# Patient Record
Sex: FEMALE | Race: WHITE | NOT HISPANIC OR LATINO | Employment: PART TIME | URBAN - METROPOLITAN AREA
[De-identification: names, ages, dates, MRNs, and addresses within clinical notes are randomized per-mention and may not be internally consistent; named-entity substitution may affect disease eponyms.]

---

## 2017-03-20 ENCOUNTER — HOSPITAL ENCOUNTER (OUTPATIENT)
Dept: MRI IMAGING | Facility: HOSPITAL | Age: 62
Discharge: HOME | End: 2017-03-20
Attending: NEUROLOGICAL SURGERY
Payer: COMMERCIAL

## 2017-03-20 DIAGNOSIS — I72.5 ANEURYSM OF OTHER PRECEREBRAL ARTERIES (HCC): ICD-10-CM

## 2017-03-20 PROCEDURE — 70544 MR ANGIOGRAPHY HEAD W/O DYE: CPT

## 2017-03-21 ENCOUNTER — ALLSCRIPTS OFFICE VISIT (OUTPATIENT)
Dept: OTHER | Facility: OTHER | Age: 62
End: 2017-03-21

## 2018-01-16 NOTE — PROGRESS NOTES
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)    Plan    · Follow-up PRN Evaluation and Treatment  Follow-up  Status: Complete  Done:  [id number]   Ordered; For: Aneurysm of basilar artery; Ordered By: Katherin Blank Performed:  Due: [id number]    Discussion/Summary    This is a 71-year-old female with an abnormality noted incidentally at the basilar apex  There is no change on serial imaging studies  I gave this patient options of repeating the study in 5 years or not at all  I do believe that the risk of this aneurysm rupturing and are very small given her age, control of blood pressure, lack of smoking, and control of hypercholesterolemia  Accordingly we have scheduled to see her back on an as-needed basis but I have not ordered follow-up imaging studies  Chief Complaint  Patient presents for 6 month follow up with MRA Head  Dx: Aneurysm of basilar artery      History of Present Illness  Patient is a 71-year-old female who has an incidentally noted artery aneurysm which is 4 mm in size  Initial presentation included visual disturbance; she reports she went to see the Eye doctor but all they suggested was that she needed glassess for long distance  On July 15, 2016 she stood up and when she turned she fell sideways and landed on a step  She had a CT Head and they noted an abnormality on CAT scan  She was sent for an MRI of the brain and also MRI of the cervical sine as well as an MRA Head where the aneurysm was confirmed  This appears to be smooth-walled and has a broad neck is features suggest a low incidence of rupture  I've recommended watchful waiting for this  Today, patient offers no complaints  Review of Systems    Constitutional: No fever, no chills, feels well, no tiredness, no recent weight gain or weight loss  Eyes: No complaints of eye pain, no red eyes, no eyesight problems, no discharge, no dry eyes, no itching of eyes     ENT: no complaints of earache, no loss of hearing, no nose bleeds, no nasal discharge, no sore throat, no hoarseness  Cardiovascular: No complaints of slow heart rate, no fast heart rate, no chest pain, no palpitations, no leg claudication, no lower extremity edema  Respiratory: No complaints of shortness of breath, no wheezing, no cough, no SOB on exertion, no orthopnea, no PND  Gastrointestinal: No complaints of abdominal pain, no constipation, no nausea or vomiting, no diarrhea, no bloody stools  Genitourinary: No complaints of dysuria, no incontinence, no pelvic pain, no dysmenorrhea, no vaginal discharge or bleeding  Musculoskeletal: No complaints of arthralgias, no myalgias, no joint swelling or stiffness, no limb pain or swelling  Integumentary: No complaints of skin rash or lesions, no itching, no skin wounds, no breast pain or lump  Neurological: No complaints of headache, no confusion, no convulsions, no numbness, no dizziness or fainting, no tingling, no limb weakness, no difficulty walking  Psychiatric: Not suicidal, no sleep disturbance, no anxiety or depression, no change in personality, no emotional problems  Endocrine: No complaints of proptosis, no hot flashes, no muscle weakness, no deepening of the voice, no feelings of weakness  Hematologic/Lymphatic: No complaints of swollen glands, no swollen glands in the neck, does not bleed easily, does not bruise easily  ROS reviewed  Active Problems    1  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)   2  Benign essential hypertension (401 1) (I10)   3  Chest pain (786 50) (R07 9)   4  Hyperlipidemia (272 4) (E78 5)   5  S/P drug eluting coronary stent placement (V45 82) (Z95 5)    Past Medical History    1  History of Atypical chest pain (786 59) (R07 89)   2  History of hypertension (V12 59) (Z86 79)   3  Old myocardial infarction (412) (I25 2)    The active problems and past medical history were reviewed and updated today  Surgical History    1   History of Cath Stent Placement    The surgical history was reviewed and updated today  Family History  Mother    1  No pertinent family history    The family history was reviewed and updated today  Social History    · Former smoker (B06 67) (J00 881)   · Housewife or homemaker   ·    · No alcohol use   · No drug use   · Two children  The social history was reviewed and updated today  Current Meds   1  Aspirin 81 MG TABS; TAKE 1 TABLET DAILY; Therapy: (Recorded:19Oct2015) to Recorded   2  Atorvastatin Calcium 80 MG Oral Tablet; TAKE 1 TABLET DAILY; Therapy: (Recorded:19Oct2015) to Recorded   3  Celecoxib 200 MG Oral Capsule; Take 1 capsule daily as needed Recorded   4  Nitrostat 0 4 MG Sublingual Tablet Sublingual; DISSOLVE 1 TABLET UNDER THE   TONGUE AS NEEDED FOR CHEST PAIN;   Therapy: (Recorded:19Oct2015) to Recorded   5  Toprol XL 25 MG Oral Tablet Extended Release 24 Hour; Take 1 tablet daily; Therapy: (Recorded:19Oct2015) to Recorded   6  TraMADol HCl - 50 MG Oral Tablet; TAKE 1 TABLET Every 6 hours PRN; Therapy: (Recorded:19Oct2015) to Recorded    The medication list was reviewed and updated today  Allergies    1  Penicillins    Vitals  Vital Signs    Recorded: 21Mar2017 08:59AM   Temperature 96 8 F   Heart Rate 64   Respiration 16   Systolic 889   Diastolic 88   Height 5 ft 6 in   Weight 194 lb    BMI Calculated 31 31   BSA Calculated 1 98   Pain Scale 0     Physical Exam     Mental Status: Alert and Oriented x3  Memory is intact  Attentive  Speech is articulate and fluent  Knowledge and vocabulary consistent with education  Grossly nonfocal   Judgment and insight: Normal   Mood and affect: Normal     Cranial Nerve Exam:  2nd cranial nerve: Normal with no noted deficit  3rd, 4th, and 6th cranial nerves: PERRLA and EOMI without later gaze nystagmus  5th cranial nerve: Normal with no noted deficit  7th cranial nerve:  Face symmetrical at grimace and at rest   8th cranial nerve: Grossly intact to finger rub bilaterally  11th cranial nerve: Shoulder shrug equal bilaterally  Motor System - Upper Extremities: Muscle strength: 5/5 bilaterally  Muscle tone: Normal bilaterally  Muscle Bulk: Normal Muscle bulk bilaterally  Motor System - Lower Extremities: Muscle strength: 5/5 bilaterally  Muscle tone: Normal bilaterally  Muscle Bulk: Normal Muscle bulk bilaterally  Reflexes: Mel Main Sign was present:   Coordination: Coordination: Finger to nose was normal, heel to knee to shin was normal able to perform rapid alternating movements well bilaterally  Involuntary movements: None   Sensory: Sensation:Sensation grossly intact to light tough and pinprick  Gait and Station: Gait and station: Linda with a normal gait and station  Tandem walk is normal, Heel walk and toe walk intact and without sign of paresis  Constitutional General appearance: No acute distress, well appearing and well nourished  Head and Face Head and face: Normal, atraumatic  Results/Data  Diagnostic Studies Reviewed Neuro[facility]:   I personally reviewed the MRI and MRA of the brain in detail with the patient  MRI Review An MRI and MRA of the brain Are carefully reviewed  There is an abnormality at the basilar apex  This appears to be associated with the takeoff of the first division of the posterior cerebral artery  The superior cerebral artery is inferior to this  This dilated portion of the takeoff may be an aneurysm  This is unchanged from the previous study  Alternatively this could be a dolicoectatic region of the basilar apex  Importantly there is no change in the size of this region in comparison to the previous study        Signatures   Electronically signed by : JOANN Chavez ; Mar 22 2017 10:17AM EST                       (Author)

## 2018-01-22 VITALS
WEIGHT: 194 LBS | BODY MASS INDEX: 31.18 KG/M2 | DIASTOLIC BLOOD PRESSURE: 88 MMHG | TEMPERATURE: 96.8 F | RESPIRATION RATE: 16 BRPM | HEIGHT: 66 IN | HEART RATE: 64 BPM | SYSTOLIC BLOOD PRESSURE: 139 MMHG

## 2019-11-20 ENCOUNTER — TRANSCRIBE ORDERS (OUTPATIENT)
Dept: ADMINISTRATIVE | Facility: HOSPITAL | Age: 64
End: 2019-11-20

## 2019-11-20 DIAGNOSIS — M25.561 RIGHT KNEE PAIN, UNSPECIFIED CHRONICITY: Primary | ICD-10-CM

## 2019-11-26 ENCOUNTER — HOSPITAL ENCOUNTER (OUTPATIENT)
Dept: RADIOLOGY | Facility: HOSPITAL | Age: 64
Discharge: HOME | End: 2019-11-26
Attending: FAMILY MEDICINE

## 2019-11-26 DIAGNOSIS — M25.561 RIGHT KNEE PAIN, UNSPECIFIED CHRONICITY: ICD-10-CM

## 2019-12-09 ENCOUNTER — HOSPITAL ENCOUNTER (OUTPATIENT)
Dept: RADIOLOGY | Facility: HOSPITAL | Age: 64
Discharge: HOME | End: 2019-12-09
Attending: FAMILY MEDICINE
Payer: COMMERCIAL

## 2019-12-09 PROCEDURE — 73721 MRI JNT OF LWR EXTRE W/O DYE: CPT

## 2019-12-19 ENCOUNTER — HOSPITAL ENCOUNTER (OUTPATIENT)
Facility: HOSPITAL | Age: 64
Setting detail: OBSERVATION
Discharge: HOME | End: 2019-12-20
Attending: EMERGENCY MEDICINE | Admitting: INTERNAL MEDICINE
Payer: COMMERCIAL

## 2019-12-19 ENCOUNTER — APPOINTMENT (EMERGENCY)
Dept: RADIOLOGY | Facility: HOSPITAL | Age: 64
End: 2019-12-19
Payer: COMMERCIAL

## 2019-12-19 DIAGNOSIS — R07.9 CHEST PAIN: Primary | ICD-10-CM

## 2019-12-19 DIAGNOSIS — I25.10 CORONARY ARTERY DISEASE INVOLVING NATIVE HEART WITHOUT ANGINA PECTORIS, UNSPECIFIED VESSEL OR LESION TYPE: ICD-10-CM

## 2019-12-19 DIAGNOSIS — I10 ESSENTIAL HYPERTENSION: ICD-10-CM

## 2019-12-19 PROBLEM — E78.5 HYPERLIPIDEMIA: Status: ACTIVE | Noted: 2019-12-19

## 2019-12-19 PROBLEM — I72.5 ANEURYSM OF BASILAR ARTERY (HCC): Status: ACTIVE | Noted: 2019-12-19

## 2019-12-19 PROBLEM — M19.90 OSTEOARTHRITIS: Status: ACTIVE | Noted: 2019-12-19

## 2019-12-19 LAB
ALBUMIN SERPL BCP-MCNC: 3.4 G/DL (ref 3.5–5)
ALP SERPL-CCNC: 127 U/L (ref 46–116)
ALT SERPL W P-5'-P-CCNC: 25 U/L (ref 12–78)
ANION GAP SERPL CALCULATED.3IONS-SCNC: 9 MMOL/L (ref 4–13)
APTT PPP: 23 SECONDS (ref 23–37)
AST SERPL W P-5'-P-CCNC: 23 U/L (ref 5–45)
ATRIAL RATE: 58 BPM
BASOPHILS # BLD AUTO: 0.06 THOUSANDS/ΜL (ref 0–0.1)
BASOPHILS NFR BLD AUTO: 1 % (ref 0–1)
BILIRUB SERPL-MCNC: 0.86 MG/DL (ref 0.2–1)
BUN SERPL-MCNC: 12 MG/DL (ref 5–25)
CALCIUM SERPL-MCNC: 8.8 MG/DL (ref 8.3–10.1)
CHLORIDE SERPL-SCNC: 105 MMOL/L (ref 100–108)
CK SERPL-CCNC: 67 U/L (ref 26–192)
CO2 SERPL-SCNC: 28 MMOL/L (ref 21–32)
CREAT SERPL-MCNC: 0.88 MG/DL (ref 0.6–1.3)
EOSINOPHIL # BLD AUTO: 0.16 THOUSAND/ΜL (ref 0–0.61)
EOSINOPHIL NFR BLD AUTO: 2 % (ref 0–6)
ERYTHROCYTE [DISTWIDTH] IN BLOOD BY AUTOMATED COUNT: 12 % (ref 11.6–15.1)
GFR SERPL CREATININE-BSD FRML MDRD: 70 ML/MIN/1.73SQ M
GLUCOSE SERPL-MCNC: 169 MG/DL (ref 65–140)
HCT VFR BLD AUTO: 42.6 % (ref 34.8–46.1)
HGB BLD-MCNC: 13.7 G/DL (ref 11.5–15.4)
IMM GRANULOCYTES # BLD AUTO: 0.04 THOUSAND/UL (ref 0–0.2)
IMM GRANULOCYTES NFR BLD AUTO: 0 % (ref 0–2)
INR PPP: 1 (ref 0.84–1.19)
LIPASE SERPL-CCNC: 166 U/L (ref 73–393)
LYMPHOCYTES # BLD AUTO: 3.83 THOUSANDS/ΜL (ref 0.6–4.47)
LYMPHOCYTES NFR BLD AUTO: 37 % (ref 14–44)
MAGNESIUM SERPL-MCNC: 2.2 MG/DL (ref 1.6–2.6)
MCH RBC QN AUTO: 29.1 PG (ref 26.8–34.3)
MCHC RBC AUTO-ENTMCNC: 32.2 G/DL (ref 31.4–37.4)
MCV RBC AUTO: 91 FL (ref 82–98)
MONOCYTES # BLD AUTO: 0.8 THOUSAND/ΜL (ref 0.17–1.22)
MONOCYTES NFR BLD AUTO: 8 % (ref 4–12)
NEUTROPHILS # BLD AUTO: 5.54 THOUSANDS/ΜL (ref 1.85–7.62)
NEUTS SEG NFR BLD AUTO: 52 % (ref 43–75)
NRBC BLD AUTO-RTO: 0 /100 WBCS
P AXIS: 65 DEGREES
PLATELET # BLD AUTO: 185 THOUSANDS/UL (ref 149–390)
PMV BLD AUTO: 11.8 FL (ref 8.9–12.7)
POTASSIUM SERPL-SCNC: 4.3 MMOL/L (ref 3.5–5.3)
PR INTERVAL: 158 MS
PROT SERPL-MCNC: 7.3 G/DL (ref 6.4–8.2)
PROTHROMBIN TIME: 12.6 SECONDS (ref 11.6–14.5)
QRS AXIS: 58 DEGREES
QRSD INTERVAL: 88 MS
QT INTERVAL: 416 MS
QTC INTERVAL: 408 MS
RBC # BLD AUTO: 4.7 MILLION/UL (ref 3.81–5.12)
SODIUM SERPL-SCNC: 142 MMOL/L (ref 136–145)
T WAVE AXIS: 80 DEGREES
TROPONIN I SERPL-MCNC: <0.02 NG/ML
VENTRICULAR RATE: 58 BPM
WBC # BLD AUTO: 10.43 THOUSAND/UL (ref 4.31–10.16)

## 2019-12-19 PROCEDURE — 83690 ASSAY OF LIPASE: CPT | Performed by: EMERGENCY MEDICINE

## 2019-12-19 PROCEDURE — 82550 ASSAY OF CK (CPK): CPT | Performed by: EMERGENCY MEDICINE

## 2019-12-19 PROCEDURE — 84484 ASSAY OF TROPONIN QUANT: CPT | Performed by: NURSE PRACTITIONER

## 2019-12-19 PROCEDURE — 84484 ASSAY OF TROPONIN QUANT: CPT | Performed by: EMERGENCY MEDICINE

## 2019-12-19 PROCEDURE — 93005 ELECTROCARDIOGRAM TRACING: CPT

## 2019-12-19 PROCEDURE — 71045 X-RAY EXAM CHEST 1 VIEW: CPT

## 2019-12-19 PROCEDURE — 80053 COMPREHEN METABOLIC PANEL: CPT | Performed by: EMERGENCY MEDICINE

## 2019-12-19 PROCEDURE — 36415 COLL VENOUS BLD VENIPUNCTURE: CPT | Performed by: EMERGENCY MEDICINE

## 2019-12-19 PROCEDURE — 93010 ELECTROCARDIOGRAM REPORT: CPT | Performed by: INTERNAL MEDICINE

## 2019-12-19 PROCEDURE — 99219 PR INITIAL OBSERVATION CARE/DAY 50 MINUTES: CPT | Performed by: INTERNAL MEDICINE

## 2019-12-19 PROCEDURE — 83735 ASSAY OF MAGNESIUM: CPT | Performed by: EMERGENCY MEDICINE

## 2019-12-19 PROCEDURE — 85610 PROTHROMBIN TIME: CPT | Performed by: EMERGENCY MEDICINE

## 2019-12-19 PROCEDURE — 85730 THROMBOPLASTIN TIME PARTIAL: CPT | Performed by: EMERGENCY MEDICINE

## 2019-12-19 PROCEDURE — 99285 EMERGENCY DEPT VISIT HI MDM: CPT | Performed by: EMERGENCY MEDICINE

## 2019-12-19 PROCEDURE — 99285 EMERGENCY DEPT VISIT HI MDM: CPT

## 2019-12-19 PROCEDURE — 85025 COMPLETE CBC W/AUTO DIFF WBC: CPT | Performed by: EMERGENCY MEDICINE

## 2019-12-19 RX ORDER — ATORVASTATIN CALCIUM 40 MG/1
80 TABLET, FILM COATED ORAL
Status: DISCONTINUED | OUTPATIENT
Start: 2019-12-19 | End: 2019-12-20 | Stop reason: HOSPADM

## 2019-12-19 RX ORDER — ONDANSETRON 2 MG/ML
4 INJECTION INTRAMUSCULAR; INTRAVENOUS EVERY 6 HOURS PRN
Status: DISCONTINUED | OUTPATIENT
Start: 2019-12-19 | End: 2019-12-20 | Stop reason: HOSPADM

## 2019-12-19 RX ORDER — ASPIRIN 81 MG/1
162 TABLET, CHEWABLE ORAL ONCE
Status: COMPLETED | OUTPATIENT
Start: 2019-12-19 | End: 2019-12-19

## 2019-12-19 RX ORDER — CLOPIDOGREL BISULFATE 75 MG/1
75 TABLET ORAL DAILY
Status: DISCONTINUED | OUTPATIENT
Start: 2019-12-19 | End: 2019-12-20

## 2019-12-19 RX ORDER — TRAMADOL HYDROCHLORIDE 50 MG/1
50 TABLET ORAL EVERY 6 HOURS PRN
Status: DISCONTINUED | OUTPATIENT
Start: 2019-12-19 | End: 2019-12-20 | Stop reason: HOSPADM

## 2019-12-19 RX ORDER — ASPIRIN 81 MG/1
81 TABLET ORAL DAILY
Status: DISCONTINUED | OUTPATIENT
Start: 2019-12-19 | End: 2019-12-20 | Stop reason: HOSPADM

## 2019-12-19 RX ORDER — METOPROLOL SUCCINATE 50 MG/1
50 TABLET, EXTENDED RELEASE ORAL DAILY
Status: DISCONTINUED | OUTPATIENT
Start: 2019-12-19 | End: 2019-12-20 | Stop reason: HOSPADM

## 2019-12-19 RX ADMIN — ASPIRIN 81 MG 162 MG: 81 TABLET ORAL at 06:56

## 2019-12-19 RX ADMIN — NITROGLYCERIN 0.5 INCH: 20 OINTMENT TOPICAL at 06:56

## 2019-12-19 RX ADMIN — METOPROLOL SUCCINATE 50 MG: 50 TABLET, EXTENDED RELEASE ORAL at 13:58

## 2019-12-19 RX ADMIN — ASPIRIN 81 MG: 81 TABLET, COATED ORAL at 13:58

## 2019-12-19 RX ADMIN — ATORVASTATIN CALCIUM 80 MG: 40 TABLET, FILM COATED ORAL at 17:37

## 2019-12-19 NOTE — PLAN OF CARE
Problem: PAIN - ADULT  Goal: Verbalizes/displays adequate comfort level or baseline comfort level  Description  Interventions:  - Encourage patient to monitor pain and request assistance  - Assess pain using appropriate pain scale  - Administer analgesics based on type and severity of pain and evaluate response  - Implement non-pharmacological measures as appropriate and evaluate response  - Consider cultural and social influences on pain and pain management  - Notify physician/advanced practitioner if interventions unsuccessful or patient reports new pain  Outcome: Progressing     Problem: SAFETY ADULT  Goal: Patient will remain free of falls  Description  INTERVENTIONS:  - Assess patient frequently for physical needs  -  Identify cognitive and physical deficits and behaviors that affect risk of falls    -  [place] fall precautions as indicated by assessment   - Educate patient/family on patient safety including physical limitations  - Instruct patient to call for assistance with activity based on assessment  - Modify environment to reduce risk of injury  - Consider OT/PT consult to assist with strengthening/mobility  Outcome: Progressing  Goal: Maintain or return to baseline ADL function  Description  INTERVENTIONS:  -  Assess patient's ability to carry out ADLs; assess patient's baseline for ADL function and identify physical deficits which impact ability to perform ADLs (bathing, care of mouth/teeth, toileting, grooming, dressing, etc )  - Assess/evaluate cause of self-care deficits   - Assess range of motion  - Assess patient's mobility; develop plan if impaired  - Assess patient's need for assistive devices and provide as appropriate  - Encourage maximum independence but intervene and supervise when necessary  - Involve family in performance of ADLs  - Assess for home care needs following discharge   - Consider OT consult to assist with ADL evaluation and planning for discharge  - Provide patient education as appropriate  Outcome: Progressing  Goal: Maintain or return mobility status to optimal level  Description  INTERVENTIONS:  - Assess patient's baseline mobility status (ambulation, transfers, stairs, etc )    - Identify cognitive and physical deficits and behaviors that affect mobility  - Identify mobility aids required to assist with transfers and/or ambulation (gait belt, sit-to-stand, lift, walker, cane, etc )  - [place] fall precautions as indicated by assessment  - Record patient progress and toleration of activity level on Mobility SBAR; progress patient to next Phase/Stage  - Instruct patient to call for assistance with activity based on assessment  - Consider rehabilitation consult to assist with strengthening/weightbearing, etc   Outcome: Progressing     Problem: CARDIOVASCULAR - ADULT  Goal: Maintains optimal cardiac output and hemodynamic stability  Description  INTERVENTIONS:  - Monitor I/O, vital signs and rhythm  - Monitor for S/S and trends of decreased cardiac output  - Administer and titrate ordered vasoactive medications to optimize hemodynamic stability  - Assess quality of pulses, skin color and temperature  - Assess for signs of decreased coronary artery perfusion  - Instruct patient to report change in severity of symptoms  Outcome: Progressing  Goal: Absence of cardiac dysrhythmias or at baseline rhythm  Description  INTERVENTIONS:  - Continuous cardiac monitoring, vital signs, obtain 12 lead EKG if ordered  - Administer antiarrhythmic and heart rate control medications as ordered  - Monitor electrolytes and administer replacement therapy as ordered  Outcome: Progressing

## 2019-12-19 NOTE — ASSESSMENT & PLAN NOTE
· Left sided cp relieved by nitro  Felt similar pain as when she had her last MI   NINOSKA: 2   Initial Troponin: <0 02   Trend x3 or to peak   Initial EKG: Sinus bradycardia, heart rate 58  T ave inversions on anterior leads  NINOSKA score 2  Never had stress test since her stent   Will obtain inpatient stress test

## 2019-12-19 NOTE — ED PROVIDER NOTES
History  Chief Complaint   Patient presents with    Chest Pain     Pt  having intermittant chest pain  Took 1 SL ntg with some relief, history of cardiac stent     Patient is a 62year old female with intermittent worsening left sided chest pain since yesterday without radiation  No sob, fever or N/V  No cough  No travel  Tried NTG at home with brief relief but pain returns  Was last seen in this ED on 7/15/16 for head injury  [facility] website checked on this patient and last Rx filled was on 11/19/19 for tramadol for 23 day supply  History provided by:  Patient   used: No    Chest Pain   Associated symptoms: no cough, no fever, no nausea, no shortness of breath and not vomiting        Prior to Admission Medications   Prescriptions Last Dose Informant Patient Reported? Taking? Atorvastatin Calcium (LIPITOR PO)   Yes No   Sig: Take by mouth  Topiramate (TOPAMAX PO)   Yes No   Sig: Take by mouth  aspirin 81 MG tablet   Yes Yes   Sig: Take 81 mg by mouth daily  celecoxib (CeleBREX) 50 MG capsule   Yes No   Sig: Take 50 mg by mouth daily    clopidogrel (PLAVIX) 75 mg tablet   Yes No   Sig: Take 75 mg by mouth daily  diclofenac (VOLTAREN) 75 mg EC tablet   No No   Sig: Take 1 tablet (75 mg total) by mouth 2 (two) times a day  traMADol (ULTRAM) 50 mg tablet   Yes Yes   Sig: Take 50 mg by mouth every 6 (six) hours as needed for moderate pain  Facility-Administered Medications: None       Past Medical History:   Diagnosis Date    Cardiac disease        Past Surgical History:   Procedure Laterality Date    CORONARY STENT PLACEMENT         History reviewed  No pertinent family history  I have reviewed and agree with the history as documented  Social History     Tobacco Use    Smoking status: Former Smoker    Smokeless tobacco: Never Used   Substance Use Topics    Alcohol use: No    Drug use: No        Review of Systems   Constitutional: Negative for fever     Respiratory: Negative for cough and shortness of breath  Cardiovascular: Positive for chest pain  Gastrointestinal: Negative for nausea and vomiting  All other systems reviewed and are negative  Physical Exam  Physical Exam   Constitutional: She is oriented to person, place, and time  She appears well-developed and well-nourished  She appears distressed (moderate)  HENT:   Head: Normocephalic and atraumatic  Mucous membranes moist     Eyes: No scleral icterus  Neck: Normal range of motion  Neck supple  No JVD present  No tracheal deviation present  Cardiovascular: Normal rate, regular rhythm and normal heart sounds  No murmur heard  Pulmonary/Chest: Effort normal and breath sounds normal  No stridor  No respiratory distress  She has no wheezes  She has no rales  She exhibits no tenderness  Abdominal: Soft  Bowel sounds are normal  She exhibits no distension  There is no tenderness  Musculoskeletal: She exhibits no edema or deformity  Neurological: She is alert and oriented to person, place, and time  Skin: Skin is warm and dry  No rash noted  Psychiatric: She has a normal mood and affect  Nursing note and vitals reviewed        Vital Signs  ED Triage Vitals [12/19/19 0641]   Temperature Pulse Respirations Blood Pressure SpO2   97 6 °F (36 4 °C) 62 18 156/73 98 %      Temp Source Heart Rate Source Patient Position - Orthostatic VS BP Location FiO2 (%)   Oral Monitor Lying Right arm --      Pain Score       2           Vitals:    12/19/19 0641   BP: 156/73   Pulse: 62   Patient Position - Orthostatic VS: Lying         Visual Acuity      ED Medications  Medications   nitroglycerin (NITRO-BID) 2 % TD ointment 0 5 inch (0 5 inches Topical Given 12/19/19 0656)   aspirin chewable tablet 162 mg (162 mg Oral Given 12/19/19 0656)       Diagnostic Studies  Results Reviewed     Procedure Component Value Units Date/Time    Troponin I [[id number]]  (Normal) Collected:  12/19/19 0653    Lab Status:  Final result Specimen:  Blood from Arm, Right Updated:  12/19/19 0721     Troponin I <0 02 ng/mL     Comprehensive metabolic panel [[id number]]  (Abnormal) Collected:  12/19/19 0653    Lab Status:  Final result Specimen:  Blood from Arm, Right Updated:  12/19/19 0719     Sodium 142 mmol/L      Potassium 4 3 mmol/L      Chloride 105 mmol/L      CO2 28 mmol/L      ANION GAP 9 mmol/L      BUN 12 mg/dL      Creatinine 0 88 mg/dL      Glucose 169 mg/dL      Calcium 8 8 mg/dL      AST 23 U/L      ALT 25 U/L      Alkaline Phosphatase 127 U/L      Total Protein 7 3 g/dL      Albumin 3 4 g/dL      Total Bilirubin 0 86 mg/dL      eGFR 70 ml/min/1 73sq m     Narrative:       [place] guidelines for Chronic Kidney Disease (CKD):     Stage 1 with normal or high GFR (GFR > 90 mL/min/1 73 square meters)    Stage 2 Mild CKD (GFR = 60-89 mL/min/1 73 square meters)    Stage 3A Moderate CKD (GFR = 45-59 mL/min/1 73 square meters)    Stage 3B Moderate CKD (GFR = 30-44 mL/min/1 73 square meters)    Stage 4 Severe CKD (GFR = 15-29 mL/min/1 73 square meters)    Stage 5 End Stage CKD (GFR <15 mL/min/1 73 square meters)  Note: GFR calculation is accurate only with a steady state creatinine    Lipase [[id number]]  (Normal) Collected:  12/19/19 0653    Lab Status:  Final result Specimen:  Blood from Arm, Right Updated:  12/19/19 0719     Lipase 166 u/L     CK Total with Reflex CKMB [[id number]]  (Normal) Collected:  12/19/19 0653    Lab Status:  Final result Specimen:  Blood from Arm, Right Updated:  12/19/19 0719     Total CK 67 U/L     Magnesium [[id number]]  (Normal) Collected:  12/19/19 0653    Lab Status:  Final result Specimen:  Blood from Arm, Right Updated:  12/19/19 0719     Magnesium 2 2 mg/dL     CBC and differential [[id number]]  (Abnormal) Collected:  12/19/19 0653    Lab Status:  Final result Specimen:  Blood from Arm, Right Updated:  12/19/19 0701     WBC 10 43 Thousand/uL      RBC 4 70 Million/uL Hemoglobin 13 7 g/dL      Hematocrit 42 6 %      MCV 91 fL      MCH 29 1 pg      MCHC 32 2 g/dL      RDW 12 0 %      MPV 11 8 fL      Platelets 176 Thousands/uL      nRBC 0 /100 WBCs      Neutrophils Relative 52 %      Immat GRANS % 0 %      Lymphocytes Relative 37 %      Monocytes Relative 8 %      Eosinophils Relative 2 %      Basophils Relative 1 %      Neutrophils Absolute 5 54 Thousands/µL      Immature Grans Absolute 0 04 Thousand/uL      Lymphocytes Absolute 3 83 Thousands/µL      Monocytes Absolute 0 80 Thousand/µL      Eosinophils Absolute 0 16 Thousand/µL      Basophils Absolute 0 06 Thousands/µL     Protime-INR [[id number]] Collected:  12/19/19 0653    Lab Status: In process Specimen:  Blood from Arm, Right Updated:  12/19/19 0658    APTT [[id number]] Collected:  12/19/19 0653    Lab Status: In process Specimen:  Blood from Arm, Right Updated:  12/19/19 0658                 XR chest 1 view portable   ED Interpretation by Rosana Damian MD (12/19 7242)   No acute disease read by me  Procedures  ECG 12 Lead Documentation Only  Date/Time: 12/19/2019 6:42 AM  Performed by: Rosana Damian MD  Authorized by: Rosana Damian MD     Indications / Diagnosis:  Chest pain  ECG reviewed by me, the ED Provider: yes    Patient location:  ED  Previous ECG:     Previous ECG:  Compared to current    Comparison ECG info:  12/2/15    Similarity:  Changes noted (s  daren now)  Rate:     ECG rate:  58    ECG rate assessment: bradycardic    Rhythm:     Rhythm: sinus bradycardia    Ectopy:     Ectopy: none    QRS:     QRS axis:  Normal    QRS intervals:  Normal  Conduction:     Conduction: normal    ST segments:     ST segments:  Normal  T waves:     T waves: inverted      Inverted:  V1, V2 and V3             ED Course  ED Course as of Dec 19 0735   Thu Dec 19, 2019   0651 EKG d/w patient        9021 Labs and CXR d/w patient  Patient feeling better               HEART Risk Score      Most Recent Value   History  2 Filed at: 12/19/2019 0728   ECG  1 Filed at: 12/19/2019 5047   Age  1 Filed at: 12/19/2019 4357   Risk Factors  2 Filed at: 12/19/2019 0728   Troponin  0 Filed at: 12/19/2019 0728   Heart Score Risk Calculator   History  2 Filed at: 12/19/2019 0728   ECG  1 Filed at: 12/19/2019 9286   Age  1 Filed at: 12/19/2019 8686   Risk Factors  2 Filed at: 12/19/2019 0728   Troponin  0 Filed at: 12/19/2019 0728   HEART Score  6 Filed at: 12/19/2019 0722   HEART Score  6 Filed at: 12/19/2019 0728                    NINOSKA Risk Score      Most Recent Value   Age >= 72  0 Filed at: 12/19/2019 0295   Known CAD (stenosis >= 50%)  1 Filed at: 12/19/2019 5153   Recent (<=24 hrs) Service Angina  0 Filed at: 12/19/2019 0733   ST Deviation >= 0 5 mm  0 Filed at: 12/19/2019 0729   3+ CAD Risk Factors (FHx, HTN, HLP, DM, Smoker)  1 Filed at: 12/19/2019 0063   Aspirin Use Past 7 Days  1 Filed at: 12/19/2019 2902   Elevated Cardiac Markers  0 Filed at: 12/19/2019 0729   NINOSKA Risk Score (Calculated)  2 Filed at: 12/19/2019 9033              MDM  Number of Diagnoses or Management Options  Diagnosis management comments: DDX including but not limited to: ACS, MI, PE, PTX, pneumonia, dissection, pleurisy, pericarditis, myocarditis, rhabdomyolysis, GI etiology          Amount and/or Complexity of Data Reviewed  Clinical lab tests: ordered and reviewed  Tests in the radiology section of CPT®: ordered and reviewed  Decide to obtain previous medical records or to obtain history from someone other than the patient: yes  Review and summarize past medical records: yes  Independent visualization of images, tracings, or specimens: yes          Disposition  Final diagnoses:   Chest pain     Time reflects when diagnosis was documented in both MDM as applicable and the Disposition within this note     Time User Action Codes Description Comment    12/19/2019  7:35 AM Carmen Valencia Add [R07 9] Chest pain       ED Disposition     ED Disposition Condition Date/Time Comment    Admit Stable Thu Dec 19, 2019  7:35 AM Case was discussed with ALEXIS Guzmán  and the patient's admission status was agreed to be Admission Status: observation status to the service of Dr Ros Linares   Follow-up Information    None         Patient's Medications   Discharge Prescriptions    No medications on file     No discharge procedures on file      ED Provider  Electronically Signed by           Darline Saucedo MD  12/19/19 1371

## 2019-12-19 NOTE — H&P
H&P- Reg Valdez 1955, 62 y o  female MRN: [id number]    Unit/Bed#: S -01 Encounter: [id number]    Primary Care Provider: Renee Ayers DO   Date and time admitted to hospital: 12/19/2019  6:32 AM        * Chest pain  Assessment & Plan  · Left sided cp relieved by nitro  Felt similar pain as when she had her last MI   NINOSKA: 2   Initial Troponin: <0 02   Trend x3 or to peak   Initial EKG: Sinus bradycardia, heart rate 58  T ave inversions on anterior leads  NINOSKA score 2  Never had stress test since her stent  Will obtain inpatient stress test    CAD (coronary artery disease)  Assessment & Plan  · History of CAD with stent placement  · Continue    Aneurysm of basilar artery (HCC)  Assessment & Plan  Followed by NSx    Hyperlipidemia  Assessment & Plan  · Continue statin  Hypertension  Assessment & Plan  · Blood pressure 150s/70s on admission  · Takes metoprolol XL at home      VTE Prophylaxis: Enoxaparin (Lovenox)  / sequential compression device   Code Status: Full code  POLST: POLST form is not discussed and not completed at this time  Discussion with family: Above    Anticipated Length of Stay:  Patient will be admitted on an Observation basis with an anticipated length of stay of  < 2 midnights  Justification for Hospital Stay: Above    Total Time for Visit, including Counseling / Coordination of Care: 20 minutes  Greater than 50% of this total time spent on direct patient counseling and coordination of care  Chief Complaint:   Chest pain    History of Present Illness:    Reg Valdez is a 62 y o  female with PMHx of CAD with prior stent, hypertension and dyslipidemia who presents with chest pain  She was in her usual state of health until yesterday afternoon when she started experiencing chest pain  She described as crampy pain in her left upper chest with no radiation and no associated dyspnea diaphoresis and palpitation    She felt it was similar to the pain that she had with her prior MI  She took 1 dose of nitro at home with some relief  Her pain did persist gap prompted her ER visit  She was given another nitro at home which subsided her chest pain  Currently she is pain free  Patient has not had stress test since her last MI about 4 years ago  Review of Systems:    Review of Systems   Constitutional: Negative for appetite change, chills and diaphoresis  Respiratory: Negative for cough, choking and shortness of breath  Cardiovascular: Positive for chest pain  Negative for palpitations  Gastrointestinal: Negative for abdominal pain and blood in stool  Genitourinary: Negative for dysuria and enuresis  All other systems reviewed and are negative  Past Medical and Surgical History:     Past Medical History:   Diagnosis Date    Cardiac disease        Past Surgical History:   Procedure Laterality Date    CORONARY STENT PLACEMENT         Meds/Allergies:    Prior to Admission medications    Medication Sig Start Date End Date Taking? Authorizing Provider   aspirin 81 MG tablet Take 81 mg by mouth daily  Yes Historical Provider, MD   traMADol (ULTRAM) 50 mg tablet Take 50 mg by mouth every 6 (six) hours as needed for moderate pain  Yes Historical Provider, MD   Atorvastatin Calcium (LIPITOR PO) Take by mouth  Historical Provider, MD   celecoxib (CeleBREX) 50 MG capsule Take 50 mg by mouth daily     Historical Provider, MD   clopidogrel (PLAVIX) 75 mg tablet Take 75 mg by mouth daily  Historical Provider, MD   diclofenac (VOLTAREN) 75 mg EC tablet Take 1 tablet (75 mg total) by mouth 2 (two) times a day  3/9/16   Gabriel Black DO   Topiramate (TOPAMAX PO) Take by mouth  Historical Provider, MD         Allergies: Allergies   Allergen Reactions    Penicillins Anaphylaxis     Hives and throat closes   No epi pen or medical alert bracelet       Social History:     Marital Status:   Patient Pre-hospital Living Situation: Home    Social History Substance and Sexual Activity   Alcohol Use No     Social History     Tobacco Use   Smoking Status Former Smoker   Smokeless Tobacco Never Used     Social History     Substance and Sexual Activity   Drug Use No       Family History:    History reviewed  No pertinent family history  Physical Exam:     Vitals:   Blood Pressure: 144/71 (12/19/19 0812)  Pulse: 57 (12/19/19 0812)  Temperature: 98 °F (36 7 °C) (12/19/19 0812)  Temp Source: Oral (12/19/19 6939)  Respirations: 19 (12/19/19 0812)  Height: 5' 6" (167 6 cm) (12/19/19 7991)  Weight - Scale: 89 4 kg (197 lb) (12/19/19 0812)  SpO2: 97 % (12/19/19 1022)    Physical Exam   Constitutional: She is oriented to person, place, and time  No distress  Cardiovascular: Normal rate and regular rhythm  Pulmonary/Chest: Effort normal and breath sounds normal  No respiratory distress  Abdominal: Soft  Bowel sounds are normal  She exhibits no distension  Musculoskeletal: She exhibits no edema  Neurological: She is alert and oriented to person, place, and time  Skin: Skin is warm  She is not diaphoretic  Psychiatric: She has a normal mood and affect  Additional Data:     Lab Results:     Results from last 7 days   Lab Units 12/19/19  0653   WBC Thousand/uL 10 43*   HEMOGLOBIN g/dL 13 7   HEMATOCRIT % 42 6   PLATELETS Thousands/uL 185   NEUTROS PCT % 52   LYMPHS PCT % 37   MONOS PCT % 8   EOS PCT % 2     Results from last 7 days   Lab Units 12/19/19  0653   SODIUM mmol/L 142   POTASSIUM mmol/L 4 3   CHLORIDE mmol/L 105   CO2 mmol/L 28   BUN mg/dL 12   CREATININE mg/dL 0 88   ANION GAP mmol/L 9   CALCIUM mg/dL 8 8   ALBUMIN g/dL 3 4*   TOTAL BILIRUBIN mg/dL 0 86   ALK PHOS U/L 127*   ALT U/L 25   AST U/L 23   GLUCOSE RANDOM mg/dL 169*     Results from last 7 days   Lab Units 12/19/19  0653   INR  1 00                   Imaging:    XR chest 1 view portable   ED Interpretation by Georgina Chen MD (12/19 1269)   No acute disease read by me  Final Result by Isabel Izaguirre MD (12/19 9201)      No acute cardiopulmonary disease  Workstation performed: [id number]             EKG, Pathology, and Other Studies Reviewed on Admission:   · EKG: NSR 58/min  T wave inversions in anterior leads    Allscripts / Epic Records Reviewed: Yes     ** Please Note: This note has been constructed using a voice recognition system   **

## 2019-12-20 ENCOUNTER — APPOINTMENT (OUTPATIENT)
Dept: NUCLEAR MEDICINE | Facility: HOSPITAL | Age: 64
End: 2019-12-20
Payer: COMMERCIAL

## 2019-12-20 ENCOUNTER — APPOINTMENT (OUTPATIENT)
Dept: NON INVASIVE DIAGNOSTICS | Facility: HOSPITAL | Age: 64
End: 2019-12-20
Payer: COMMERCIAL

## 2019-12-20 VITALS
SYSTOLIC BLOOD PRESSURE: 141 MMHG | TEMPERATURE: 98 F | HEART RATE: 84 BPM | HEIGHT: 66 IN | DIASTOLIC BLOOD PRESSURE: 84 MMHG | WEIGHT: 197 LBS | RESPIRATION RATE: 18 BRPM | OXYGEN SATURATION: 98 % | BODY MASS INDEX: 31.66 KG/M2

## 2019-12-20 LAB
CHEST PAIN STATEMENT: NORMAL
MAX DIASTOLIC BP: 68 MMHG
MAX HEART RATE: 111 BPM
MAX PREDICTED HEART RATE: 157 BPM
MAX. SYSTOLIC BP: 126 MMHG
PROTOCOL NAME: NORMAL
REASON FOR TERMINATION: NORMAL
TARGET HR FORMULA: NORMAL
TEST INDICATION: NORMAL
TIME IN EXERCISE PHASE: NORMAL

## 2019-12-20 PROCEDURE — 93018 CV STRESS TEST I&R ONLY: CPT | Performed by: INTERNAL MEDICINE

## 2019-12-20 PROCEDURE — 78452 HT MUSCLE IMAGE SPECT MULT: CPT

## 2019-12-20 PROCEDURE — 93017 CV STRESS TEST TRACING ONLY: CPT

## 2019-12-20 PROCEDURE — A9502 TC99M TETROFOSMIN: HCPCS

## 2019-12-20 PROCEDURE — 93016 CV STRESS TEST SUPVJ ONLY: CPT | Performed by: INTERNAL MEDICINE

## 2019-12-20 PROCEDURE — 78452 HT MUSCLE IMAGE SPECT MULT: CPT | Performed by: INTERNAL MEDICINE

## 2019-12-20 PROCEDURE — 99217 PR OBSERVATION CARE DISCHARGE MANAGEMENT: CPT | Performed by: INTERNAL MEDICINE

## 2019-12-20 RX ORDER — METOPROLOL SUCCINATE 50 MG/1
50 TABLET, EXTENDED RELEASE ORAL DAILY
Refills: 0
Start: 2019-12-21

## 2019-12-20 RX ORDER — ATORVASTATIN CALCIUM 80 MG/1
80 TABLET, FILM COATED ORAL DAILY
Refills: 0
Start: 2019-12-20

## 2019-12-20 RX ADMIN — REGADENOSON 0.4 MG: 0.08 INJECTION, SOLUTION INTRAVENOUS at 09:38

## 2019-12-20 RX ADMIN — ASPIRIN 81 MG: 81 TABLET, COATED ORAL at 11:35

## 2019-12-20 RX ADMIN — METOPROLOL SUCCINATE 50 MG: 50 TABLET, EXTENDED RELEASE ORAL at 11:37

## 2019-12-20 NOTE — DISCHARGE SUMMARY
Discharge- Brittney Lomas 1955, 62 y o  female MRN: [id number]    Unit/Bed#: S -01 Encounter: [id number]    Primary Care Provider: Mariaa Brooks DO   Date and time admitted to hospital: 12/19/2019  6:32 AM        * Chest pain  Assessment & Plan  · Pain has since resolved  DDx MSK vs anxiety vs unstable angina vs coronary vasospasm   NINOSKA: 2, so initially evaluated with troponin x 3 which remained <0 02   Initial EKG: Sinus bradycardia, heart rate 58  T wave inversions on anterior leads  Inpatient stress test unremarkable  Normal study after pharmacologic vasodilation  There was image artifact, without diagnostic evidence for perfusion abnormality    PLAN  · Stable for home discharge  · Follow up with PCP outpatient    CAD (coronary artery disease)  Assessment & Plan  · History of CAD with stent placement  · Continue aspirin    Hyperlipidemia  Assessment & Plan  · Continue statin  Hypertension  Assessment & Plan  · Blood pressure 150s/70s on admission  · BP ranges in 847R-SQI 389L systolic during stay  · Takes metoprolol XL at home; will continue inpatient  · Consider closely following outpatient if blood pressure continues to be elevated        Discharging Resident Physician: Patel Ramos DO  Attending: Pao Gomes MD  PCP: Mariaa Brooks DO  Admission Date: 12/19/2019  Discharge Date: 12/20/19    Disposition:     Home    Reason for Admission: chest pain, MI r/o    Consultations During Hospital Stay:  · none    Procedures Performed:     · NM Myocardial perfusion spect (pharmacologic induced stressed and back/or rest)    Significant Findings / Test Results:     · EKG with T-wave inversions in anterior leads    Incidental Findings:   · None     Test Results Pending at Discharge (will require follow up): · None     Outpatient Tests Requested:  · None    Complications:  None    Hospital Course:      Brittney Lomas is a 62 y o  female with past medical history of coronary artery disease with prior stent, hypertension and dyslipidemia, who originally presented to the hospital on 12/19/2019 due to chest pain  Given significant risk factors and history of prior MI, NINOSKA score 2, she was admitted for observation for MI rule out  Labs were all unremarkable, including troponins x3 were all <0 02, normal CK with total with reflex CK-MB, magnesium of 2 2, and PTINR and APTT unremarkable  Pharmacologic stress tests performed, and revealed normal study without evidence for perfusion abnormality  Her pain and symptoms completely resolved, and she was discharged in stable condition with recommended follow-up with PCP  Condition at Discharge: stable     Discharge Day Visit / Exam:     Subjective:  Patient reports feeling well  She reports all of her chest pain has subsided  Denies experiencing any chest pain during the stress test   Denies fevers, chills, headache, lightheadedness, chest pain, shortness of breath, abdominal pain, nausea, vomiting, diarrhea, constipation  Vitals: Blood Pressure: 141/84 (12/20/19 0700)  Pulse: 84 (12/20/19 0700)  Temperature: 98 °F (36 7 °C) (12/20/19 0700)  Temp Source: Oral (12/20/19 0700)  Respirations: 18 (12/20/19 0700)  Height: 5' 6" (167 6 cm) (12/19/19 1634)  Weight - Scale: 89 4 kg (197 lb) (12/19/19 0812)  SpO2: 98 % (12/20/19 0700)  Exam:   Physical Exam   Constitutional: She is oriented to person, place, and time  She appears well-developed and well-nourished  No distress  HENT:   Head: Normocephalic and atraumatic  Right Ear: External ear normal    Left Ear: External ear normal    Nose: Nose normal    Eyes: Conjunctivae and EOM are normal  Right eye exhibits no discharge  Left eye exhibits no discharge  No scleral icterus  Neck: Normal range of motion  Neck supple  No tracheal deviation present  Cardiovascular: Normal rate, regular rhythm, normal heart sounds and intact distal pulses  No murmur heard    Pulmonary/Chest: Effort normal and breath sounds normal  No stridor  No respiratory distress  She has no wheezes  She has no rales  She exhibits no tenderness  Musculoskeletal: She exhibits no edema  Neurological: She is alert and oriented to person, place, and time  Skin: Skin is warm  Capillary refill takes less than 2 seconds  No rash noted  She is not diaphoretic  Psychiatric: She has a normal mood and affect  Her behavior is normal    Nursing note and vitals reviewed  Discussion with Family:  Discussed plan with patient, and explained to have her follow up with her primary care physician  Discharge instructions/Information to patient and family:   See after visit summary for information provided to patient and family  Provisions for Follow-Up Care:  See after visit summary for information related to follow-up care and any pertinent home health orders  Discharge Medications:  See after visit summary for reconciled discharge medications provided to patient and family        ** Please Note: This note has been constructed using a voice recognition system **

## 2019-12-20 NOTE — ASSESSMENT & PLAN NOTE
· Pain has since resolved  DDx MSK vs anxiety vs unstable angina vs coronary vasospasm   NINOSKA: 2, so initially evaluated with troponin x 3 which remained <0 02   Initial EKG: Sinus bradycardia, heart rate 58  T wave inversions on anterior leads  Inpatient stress test unremarkable  Normal study after pharmacologic vasodilation   There was image artifact, without diagnostic evidence for perfusion abnormality    PLAN  · Stable for home discharge  · Follow up with PCP outpatient

## 2019-12-20 NOTE — UTILIZATION REVIEW
Initial Clinical Review    Admission: Date/Time/Statement: Observation admission 12/19/19 0735  Orders Placed This Encounter   Procedures    Place in Observation     Telemetry     Standing Status:   Standing     Number of Occurrences:   1     Order Specific Question:   Admitting Physician     Answer:   Manuel Tolbert     Order Specific Question:   Level of Care     Answer:   Med Surg [16]     Order Specific Question:   Bed request comments     Answer:   telemetry     ED Arrival Information     Expected Arrival 70 Carlota Browne of Arrival Escorted By Service Admission Type    - 12/19/2019 06:29 Emergent Walk-In Self Hospitalist Emergency    Arrival Complaint    Chest pain        Chief Complaint   Patient presents with    Chest Pain     Pt  having intermittant chest pain  Took 1 SL ntg with some relief, history of cardiac stent     Assessment/Plan: 62 y o  female with PMHx of CAD with prior stent, hypertension and dyslipidemia who presents with chest pain  She was in her usual state of health until yesterday afternoon when she started experiencing chest pain  She described as crampy pain in her left upper chest with no radiation and no associated dyspnea diaphoresis and palpitation  She felt it was similar to the pain that she had with her prior MI  She took 1 dose of nitro at home with some relief  Her pain did persist gap prompted her ER visit  She was given another nitro at home which subsided her chest pain  Currently she is pain free  Chest pain  Assessment & Plan  · Left sided cp relieved by nitro  Felt similar pain as when she had her last MI  · NINOSKA: 2  · Initial Troponin: <0 02  · Trend x3 or to peak  · Initial EKG: Sinus bradycardia, heart rate 58  T ave inversions on anterior leads  · NINOSKA score 2  Never had stress test since her stent  Will obtain inpatient stress test  CAD (coronary artery disease)  Assessment & Plan  · History of CAD with stent placement    · Continue  Aneurysm of basilar artery [facility])  Assessment & Plan  Followed by Clarisa  Hyperlipidemia  Assessment & Plan  · Continue statin  Hypertension  Assessment & Plan  · Blood pressure 150s/70s on admission    · Takes metoprolol XL at home  ED Triage Vitals [12/19/19 0641]   Temperature Pulse Respirations Blood Pressure SpO2   97 6 °F (36 4 °C) 62 18 156/73 98 %      Temp Source Heart Rate Source Patient Position - Orthostatic VS BP Location FiO2 (%)   Oral Monitor Lying Right arm --      Pain Score       2        Wt Readings from Last 1 Encounters:   12/19/19 89 4 kg (197 lb)     Additional Vital Signs:   12/20/19 0700  98 °F (36 7 °C)  84  18  141/84  98 %  None (Room air)  Lying   12/19/19 2223  98 3 °F (36 8 °C)  75  18  160/73  98 %  None (Room air)  Sitting   12/19/19 1800            None (Room air)     12/19/19 1500  97 8 °F (36 6 °C)  70  18  148/72  97 %  None (Room air)  Lying   12/19/19 0812  98 °F (36 7 °C)  57  19  144/71  97 %    Lying   12/19/19 0755    60  18  156/73  96 %  None (Room air)  Lying   12/19/19 0641  97 6 °F (36 4 °C)  62  18  156/73  98 %  None (Room air)  Lying      Weights (last 14 days)     Date/Time  Weight  Weight Method  Height   12/19/19 0812  89 4 kg (197 lb)  Standing scale  5' 6" (1 676 m)   12/19/19 0642  89 4 kg (197 lb 1 5 oz)  Bed scale         Pertinent Labs/Diagnostic Test Results:   Results from last 7 days   Lab Units 12/19/19  0653   WBC Thousand/uL 10 43*   HEMOGLOBIN g/dL 13 7   HEMATOCRIT % 42 6   PLATELETS Thousands/uL 185   NEUTROS ABS Thousands/µL 5 54         Results from last 7 days   Lab Units 12/19/19  0653   SODIUM mmol/L 142   POTASSIUM mmol/L 4 3   CHLORIDE mmol/L 105   CO2 mmol/L 28   ANION GAP mmol/L 9   BUN mg/dL 12   CREATININE mg/dL 0 88   EGFR ml/min/1 73sq m 70   CALCIUM mg/dL 8 8   MAGNESIUM mg/dL 2 2     Results from last 7 days   Lab Units 12/19/19  0653   AST U/L 23   ALT U/L 25   ALK PHOS U/L 127*   TOTAL PROTEIN g/dL 7 3   ALBUMIN g/dL 3 4*   TOTAL BILIRUBIN mg/dL 0 86         Results from last 7 days   Lab Units 12/19/19  0653   GLUCOSE RANDOM mg/dL 169*       Results from last 7 days   Lab Units 12/19/19  0653   CK TOTAL U/L 67     Results from last 7 days   Lab Units 12/19/19  1300 12/19/19  1015 12/19/19  0653   TROPONIN I ng/mL <0 02 <0 02 <0 02         Results from last 7 days   Lab Units 12/19/19  0653   PROTIME seconds 12 6   INR  1 00   PTT seconds 23       Results from last 7 days   Lab Units 12/19/19  0653   LIPASE u/L 166     12/19   XR chest 1 view portable   No acute cardiopulmonary disease  · EKG: NSR 58/min  T wave inversions in anterior leads    ED Treatment:   Medication Administration from 12/19/2019 0629 to 12/19/2019 6714       Date/Time Order Dose Route Action     12/19/2019 0656 nitroglycerin (NITRO-BID) 2 % TD ointment 0 5 inch 0 5 inch Topical Given     12/19/2019 0656 aspirin chewable tablet 162 mg 162 mg Oral Given        Past Medical History:   Diagnosis Date    Cardiac disease        Admitting Diagnosis: Chest pain [R07 9]  Age/Sex: 62 y o  female  Admission Orders:  Telemetry  NM stress test  scd  Scheduled Medications:    Medications:  aspirin 81 mg Oral Daily   atorvastatin 80 mg Oral Daily With Dinner   clopidogrel 75 mg Oral Daily   enoxaparin 40 mg Subcutaneous Daily   metoprolol succinate 50 mg Oral Daily     Continuous IV Infusions:     PRN Meds:    ondansetron 4 mg Intravenous Q6H PRN   traMADol 50 mg Oral Q6H PRN     Network Utilization Review Department  [email or web address]  ATTENTION: Please call with any questions or concerns to [phone] and carefully listen to the prompts so that you are directed to the right person  All voicemails are confidential   Vazquez Robles all requests for admission clinical reviews, approved or denied determinations and any other requests to dedicated fax number below belonging to the campus where the patient is receiving treatment   List of dedicated fax numbers for the Facilities:  FACILITY NAME UR FAX NUMBER   ADMISSION DENIALS (Administrative/Medical Necessity) [phone]   PARENT CHILD HEALTH (Maternity/NICU/Pediatrics) [phone]   [facility] [phone]   [facility] [phone]   [facility] [phone]   [address] [phone]   [facility]  [phone] [address] [facility] [address]  [address] [facility] [address] [facility] [phone]

## 2019-12-20 NOTE — ASSESSMENT & PLAN NOTE
· Blood pressure 150s/70s on admission    · BP ranges in 010N-VLM 024S systolic during stay  · Takes metoprolol XL at home; will continue inpatient  · Consider closely following outpatient if blood pressure continues to be elevated

## 2021-04-12 ENCOUNTER — HOSPITAL ENCOUNTER (OUTPATIENT)
Dept: RADIOLOGY | Facility: HOSPITAL | Age: 66
Discharge: HOME | End: 2021-04-12
Attending: FAMILY MEDICINE
Payer: COMMERCIAL

## 2021-04-12 ENCOUNTER — TRANSCRIBE ORDERS (OUTPATIENT)
Dept: ADMINISTRATIVE | Facility: HOSPITAL | Age: 66
End: 2021-04-12

## 2021-04-12 DIAGNOSIS — R52 PAIN: ICD-10-CM

## 2021-04-12 DIAGNOSIS — R52 PAIN: Primary | ICD-10-CM

## 2021-04-12 PROCEDURE — 73564 X-RAY EXAM KNEE 4 OR MORE: CPT

## 2021-05-19 ENCOUNTER — IMMUNIZATIONS (OUTPATIENT)
Dept: FAMILY MEDICINE CLINIC | Facility: HOSPITAL | Age: 66
End: 2021-05-19

## 2021-05-19 DIAGNOSIS — Z23 ENCOUNTER FOR IMMUNIZATION: Primary | ICD-10-CM

## 2021-05-19 PROCEDURE — 91300 SARS-COV-2 / COVID-19 MRNA VACCINE (PFIZER-BIONTECH) 30 MCG: CPT

## 2021-05-19 PROCEDURE — 0001A SARS-COV-2 / COVID-19 MRNA VACCINE (PFIZER-BIONTECH) 30 MCG: CPT

## 2021-06-10 ENCOUNTER — IMMUNIZATIONS (OUTPATIENT)
Dept: FAMILY MEDICINE CLINIC | Facility: HOSPITAL | Age: 66
End: 2021-06-10

## 2021-06-10 DIAGNOSIS — Z23 ENCOUNTER FOR IMMUNIZATION: Primary | ICD-10-CM

## 2021-06-10 PROCEDURE — 91300 SARS-COV-2 / COVID-19 MRNA VACCINE (PFIZER-BIONTECH) 30 MCG: CPT

## 2021-06-10 PROCEDURE — 0002A SARS-COV-2 / COVID-19 MRNA VACCINE (PFIZER-BIONTECH) 30 MCG: CPT

## 2022-04-13 ENCOUNTER — HOSPITAL ENCOUNTER (OUTPATIENT)
Dept: RADIOLOGY | Facility: HOSPITAL | Age: 67
Discharge: HOME | End: 2022-04-13
Attending: FAMILY MEDICINE
Payer: COMMERCIAL

## 2022-04-13 DIAGNOSIS — R52 PAIN: ICD-10-CM

## 2022-04-13 PROCEDURE — 72110 X-RAY EXAM L-2 SPINE 4/>VWS: CPT

## 2024-03-12 ENCOUNTER — HOSPITAL ENCOUNTER (OUTPATIENT)
Dept: RADIOLOGY | Facility: HOSPITAL | Age: 69
Discharge: HOME | End: 2024-03-12
Payer: COMMERCIAL

## 2024-03-12 DIAGNOSIS — M25.562 LEFT KNEE PAIN, UNSPECIFIED CHRONICITY: ICD-10-CM

## 2024-03-12 PROCEDURE — 73562 X-RAY EXAM OF KNEE 3: CPT

## 2025-03-27 ENCOUNTER — HOSPITAL ENCOUNTER (OUTPATIENT)
Dept: RADIOLOGY | Facility: HOSPITAL | Age: 70
Discharge: HOME | End: 2025-03-27
Payer: COMMERCIAL

## 2025-03-27 DIAGNOSIS — M18.11 OSTEOARTHRITIS OF CARPOMETACARPAL (CMC) JOINT OF RIGHT THUMB, UNSPECIFIED OSTEOARTHRITIS TYPE: ICD-10-CM

## 2025-03-27 DIAGNOSIS — R29.2 ABNORMAL REFLEX: ICD-10-CM

## 2025-03-27 PROCEDURE — 72050 X-RAY EXAM NECK SPINE 4/5VWS: CPT

## 2025-03-27 PROCEDURE — 73110 X-RAY EXAM OF WRIST: CPT

## 2025-06-16 ENCOUNTER — APPOINTMENT (EMERGENCY)
Dept: RADIOLOGY | Facility: HOSPITAL | Age: 70
DRG: 322 | End: 2025-06-16
Payer: COMMERCIAL

## 2025-06-16 ENCOUNTER — HOSPITAL ENCOUNTER (INPATIENT)
Facility: HOSPITAL | Age: 70
LOS: 1 days | Discharge: HOME | DRG: 322 | End: 2025-06-17
Attending: EMERGENCY MEDICINE | Admitting: INTERNAL MEDICINE
Payer: COMMERCIAL

## 2025-06-16 DIAGNOSIS — R07.9 ACUTE CHEST PAIN: ICD-10-CM

## 2025-06-16 DIAGNOSIS — I24.9 ACUTE CORONARY SYNDROME WITH HIGH TROPONIN (HCC): Primary | ICD-10-CM

## 2025-06-16 DIAGNOSIS — I21.4 NSTEMI (NON-ST ELEVATED MYOCARDIAL INFARCTION) (HCC): ICD-10-CM

## 2025-06-16 DIAGNOSIS — I20.0 UNSTABLE ANGINA PECTORIS (HCC): ICD-10-CM

## 2025-06-16 PROBLEM — E11.9 TYPE 2 DIABETES MELLITUS WITHOUT COMPLICATION, WITHOUT LONG-TERM CURRENT USE OF INSULIN (HCC): Chronic | Status: ACTIVE | Noted: 2025-06-16

## 2025-06-16 PROBLEM — D72.829 LEUKOCYTOSIS: Status: ACTIVE | Noted: 2025-06-16

## 2025-06-16 LAB
2HR DELTA HS TROPONIN: 12 NG/L
4HR DELTA HS TROPONIN: 21 NG/L
ALBUMIN SERPL BCG-MCNC: 3.7 G/DL (ref 3.5–5)
ALP SERPL-CCNC: 115 U/L (ref 34–104)
ALT SERPL W P-5'-P-CCNC: 15 U/L (ref 7–52)
ANION GAP SERPL CALCULATED.3IONS-SCNC: 4 MMOL/L (ref 4–13)
APTT PPP: 25 SECONDS (ref 23–34)
AST SERPL W P-5'-P-CCNC: 18 U/L (ref 13–39)
ATRIAL RATE: 55 BPM
ATRIAL RATE: 58 BPM
ATRIAL RATE: 68 BPM
BASOPHILS # BLD AUTO: 0.06 THOUSANDS/ÂΜL (ref 0–0.1)
BASOPHILS NFR BLD AUTO: 1 % (ref 0–1)
BILIRUB SERPL-MCNC: 1.1 MG/DL (ref 0.2–1)
BUN SERPL-MCNC: 19 MG/DL (ref 5–25)
CALCIUM SERPL-MCNC: 8.6 MG/DL (ref 8.4–10.2)
CARDIAC TROPONIN I PNL SERPL HS: 197 NG/L (ref ?–50)
CARDIAC TROPONIN I PNL SERPL HS: 209 NG/L (ref ?–50)
CARDIAC TROPONIN I PNL SERPL HS: 218 NG/L (ref ?–50)
CHLORIDE SERPL-SCNC: 104 MMOL/L (ref 96–108)
CO2 SERPL-SCNC: 32 MMOL/L (ref 21–32)
CREAT SERPL-MCNC: 0.95 MG/DL (ref 0.6–1.3)
EOSINOPHIL # BLD AUTO: 0.21 THOUSAND/ÂΜL (ref 0–0.61)
EOSINOPHIL NFR BLD AUTO: 2 % (ref 0–6)
ERYTHROCYTE [DISTWIDTH] IN BLOOD BY AUTOMATED COUNT: 13 % (ref 11.6–15.1)
EST. AVERAGE GLUCOSE BLD GHB EST-MCNC: 157 MG/DL
GFR SERPL CREATININE-BSD FRML MDRD: 61 ML/MIN/1.73SQ M
GLUCOSE SERPL-MCNC: 118 MG/DL (ref 65–140)
GLUCOSE SERPL-MCNC: 133 MG/DL (ref 65–140)
GLUCOSE SERPL-MCNC: 221 MG/DL (ref 65–140)
GLUCOSE SERPL-MCNC: 87 MG/DL (ref 65–140)
GLUCOSE SERPL-MCNC: 95 MG/DL (ref 65–140)
HBA1C MFR BLD: 7.1 %
HCT VFR BLD AUTO: 43 % (ref 34.8–46.1)
HGB BLD-MCNC: 14 G/DL (ref 11.5–15.4)
HOLD SPECIMEN: NORMAL
IMM GRANULOCYTES # BLD AUTO: 0.03 THOUSAND/UL (ref 0–0.2)
IMM GRANULOCYTES NFR BLD AUTO: 0 % (ref 0–2)
INR PPP: 0.95 (ref 0.85–1.19)
KCT BLD-ACNC: 383 SEC (ref 89–137)
LYMPHOCYTES # BLD AUTO: 3.16 THOUSANDS/ÂΜL (ref 0.6–4.47)
LYMPHOCYTES NFR BLD AUTO: 30 % (ref 14–44)
MCH RBC QN AUTO: 27.8 PG (ref 26.8–34.3)
MCHC RBC AUTO-ENTMCNC: 32.6 G/DL (ref 31.4–37.4)
MCV RBC AUTO: 86 FL (ref 82–98)
MONOCYTES # BLD AUTO: 0.69 THOUSAND/ÂΜL (ref 0.17–1.22)
MONOCYTES NFR BLD AUTO: 7 % (ref 4–12)
NEUTROPHILS # BLD AUTO: 6.54 THOUSANDS/ÂΜL (ref 1.85–7.62)
NEUTS SEG NFR BLD AUTO: 60 % (ref 43–75)
NRBC BLD AUTO-RTO: 0 /100 WBCS
P AXIS: 66 DEGREES
P AXIS: 68 DEGREES
P AXIS: 78 DEGREES
PLATELET # BLD AUTO: 165 THOUSANDS/UL (ref 149–390)
PMV BLD AUTO: 11.5 FL (ref 8.9–12.7)
POTASSIUM SERPL-SCNC: 4.2 MMOL/L (ref 3.5–5.3)
PR INTERVAL: 176 MS
PR INTERVAL: 180 MS
PR INTERVAL: 180 MS
PROT SERPL-MCNC: 7.1 G/DL (ref 6.4–8.4)
PROTHROMBIN TIME: 13.3 SECONDS (ref 12.3–15)
QRS AXIS: 52 DEGREES
QRS AXIS: 55 DEGREES
QRS AXIS: 57 DEGREES
QRSD INTERVAL: 84 MS
QRSD INTERVAL: 84 MS
QRSD INTERVAL: 86 MS
QT INTERVAL: 406 MS
QT INTERVAL: 416 MS
QT INTERVAL: 440 MS
QTC INTERVAL: 408 MS
QTC INTERVAL: 420 MS
QTC INTERVAL: 431 MS
RBC # BLD AUTO: 5.03 MILLION/UL (ref 3.81–5.12)
SODIUM SERPL-SCNC: 140 MMOL/L (ref 135–147)
SPECIMEN SOURCE: ABNORMAL
T WAVE AXIS: 57 DEGREES
T WAVE AXIS: 62 DEGREES
T WAVE AXIS: 65 DEGREES
VENTRICULAR RATE: 55 BPM
VENTRICULAR RATE: 58 BPM
VENTRICULAR RATE: 68 BPM
WBC # BLD AUTO: 10.69 THOUSAND/UL (ref 4.31–10.16)

## 2025-06-16 PROCEDURE — 93005 ELECTROCARDIOGRAM TRACING: CPT

## 2025-06-16 PROCEDURE — 027034Z DILATION OF CORONARY ARTERY, ONE ARTERY WITH DRUG-ELUTING INTRALUMINAL DEVICE, PERCUTANEOUS APPROACH: ICD-10-PCS | Performed by: INTERNAL MEDICINE

## 2025-06-16 PROCEDURE — 93454 CORONARY ARTERY ANGIO S&I: CPT | Performed by: INTERNAL MEDICINE

## 2025-06-16 PROCEDURE — C1874 STENT, COATED/COV W/DEL SYS: HCPCS | Performed by: INTERNAL MEDICINE

## 2025-06-16 PROCEDURE — 99285 EMERGENCY DEPT VISIT HI MDM: CPT

## 2025-06-16 PROCEDURE — C1725 CATH, TRANSLUMIN NON-LASER: HCPCS | Performed by: INTERNAL MEDICINE

## 2025-06-16 PROCEDURE — C1887 CATHETER, GUIDING: HCPCS | Performed by: INTERNAL MEDICINE

## 2025-06-16 PROCEDURE — 85610 PROTHROMBIN TIME: CPT | Performed by: EMERGENCY MEDICINE

## 2025-06-16 PROCEDURE — 71046 X-RAY EXAM CHEST 2 VIEWS: CPT

## 2025-06-16 PROCEDURE — 99152 MOD SED SAME PHYS/QHP 5/>YRS: CPT | Performed by: INTERNAL MEDICINE

## 2025-06-16 PROCEDURE — 83036 HEMOGLOBIN GLYCOSYLATED A1C: CPT | Performed by: INTERNAL MEDICINE

## 2025-06-16 PROCEDURE — 82948 REAGENT STRIP/BLOOD GLUCOSE: CPT

## 2025-06-16 PROCEDURE — 99255 IP/OBS CONSLTJ NEW/EST HI 80: CPT | Performed by: INTERNAL MEDICINE

## 2025-06-16 PROCEDURE — 99291 CRITICAL CARE FIRST HOUR: CPT | Performed by: EMERGENCY MEDICINE

## 2025-06-16 PROCEDURE — 84484 ASSAY OF TROPONIN QUANT: CPT | Performed by: INTERNAL MEDICINE

## 2025-06-16 PROCEDURE — 99153 MOD SED SAME PHYS/QHP EA: CPT | Performed by: INTERNAL MEDICINE

## 2025-06-16 PROCEDURE — 99223 1ST HOSP IP/OBS HIGH 75: CPT | Performed by: INTERNAL MEDICINE

## 2025-06-16 PROCEDURE — C1769 GUIDE WIRE: HCPCS | Performed by: INTERNAL MEDICINE

## 2025-06-16 PROCEDURE — C9600 PERC DRUG-EL COR STENT SING: HCPCS | Performed by: INTERNAL MEDICINE

## 2025-06-16 PROCEDURE — 80053 COMPREHEN METABOLIC PANEL: CPT

## 2025-06-16 PROCEDURE — 85730 THROMBOPLASTIN TIME PARTIAL: CPT | Performed by: EMERGENCY MEDICINE

## 2025-06-16 PROCEDURE — 85347 COAGULATION TIME ACTIVATED: CPT

## 2025-06-16 PROCEDURE — 84484 ASSAY OF TROPONIN QUANT: CPT

## 2025-06-16 PROCEDURE — 93010 ELECTROCARDIOGRAM REPORT: CPT | Performed by: INTERNAL MEDICINE

## 2025-06-16 PROCEDURE — B2111ZZ FLUOROSCOPY OF MULTIPLE CORONARY ARTERIES USING LOW OSMOLAR CONTRAST: ICD-10-PCS | Performed by: INTERNAL MEDICINE

## 2025-06-16 PROCEDURE — C1894 INTRO/SHEATH, NON-LASER: HCPCS | Performed by: INTERNAL MEDICINE

## 2025-06-16 PROCEDURE — 92928 PRQ TCAT PLMT NTRAC ST 1 LES: CPT | Performed by: INTERNAL MEDICINE

## 2025-06-16 PROCEDURE — 36415 COLL VENOUS BLD VENIPUNCTURE: CPT

## 2025-06-16 PROCEDURE — 85025 COMPLETE CBC W/AUTO DIFF WBC: CPT

## 2025-06-16 DEVICE — STENT ONYXNG35015UX ONYX 3.50X15RX
Type: IMPLANTABLE DEVICE | Site: CORONARY | Status: FUNCTIONAL
Brand: ONYX FRONTIER™

## 2025-06-16 RX ORDER — TRAMADOL HYDROCHLORIDE 50 MG/1
50 TABLET ORAL EVERY 6 HOURS PRN
Status: DISCONTINUED | OUTPATIENT
Start: 2025-06-16 | End: 2025-06-17 | Stop reason: HOSPADM

## 2025-06-16 RX ORDER — CARVEDILOL 12.5 MG/1
12.5 TABLET ORAL 2 TIMES DAILY WITH MEALS
Status: DISCONTINUED | OUTPATIENT
Start: 2025-06-16 | End: 2025-06-17

## 2025-06-16 RX ORDER — HEPARIN SODIUM 10000 [USP'U]/100ML
3-20 INJECTION, SOLUTION INTRAVENOUS
Status: DISCONTINUED | OUTPATIENT
Start: 2025-06-16 | End: 2025-06-17

## 2025-06-16 RX ORDER — TICAGRELOR 90 MG/1
TABLET, FILM COATED ORAL CODE/TRAUMA/SEDATION MEDICATION
Status: DISCONTINUED | OUTPATIENT
Start: 2025-06-16 | End: 2025-06-16 | Stop reason: HOSPADM

## 2025-06-16 RX ORDER — TICAGRELOR 90 MG/1
90 TABLET, FILM COATED ORAL 2 TIMES DAILY
Status: DISCONTINUED | OUTPATIENT
Start: 2025-06-16 | End: 2025-06-17 | Stop reason: HOSPADM

## 2025-06-16 RX ORDER — METOPROLOL SUCCINATE 50 MG/1
50 TABLET, EXTENDED RELEASE ORAL DAILY
Status: DISCONTINUED | OUTPATIENT
Start: 2025-06-17 | End: 2025-06-16

## 2025-06-16 RX ORDER — ATORVASTATIN CALCIUM 40 MG/1
80 TABLET, FILM COATED ORAL DAILY
Status: DISCONTINUED | OUTPATIENT
Start: 2025-06-17 | End: 2025-06-17 | Stop reason: HOSPADM

## 2025-06-16 RX ORDER — FENTANYL CITRATE 50 UG/ML
INJECTION, SOLUTION INTRAMUSCULAR; INTRAVENOUS CODE/TRAUMA/SEDATION MEDICATION
Status: DISCONTINUED | OUTPATIENT
Start: 2025-06-16 | End: 2025-06-16 | Stop reason: HOSPADM

## 2025-06-16 RX ORDER — MIDAZOLAM HYDROCHLORIDE 2 MG/2ML
INJECTION, SOLUTION INTRAMUSCULAR; INTRAVENOUS CODE/TRAUMA/SEDATION MEDICATION
Status: DISCONTINUED | OUTPATIENT
Start: 2025-06-16 | End: 2025-06-16 | Stop reason: HOSPADM

## 2025-06-16 RX ORDER — HEPARIN SODIUM 1000 [USP'U]/ML
2000 INJECTION, SOLUTION INTRAVENOUS; SUBCUTANEOUS EVERY 6 HOURS PRN
Status: DISCONTINUED | OUTPATIENT
Start: 2025-06-16 | End: 2025-06-17

## 2025-06-16 RX ORDER — VERAPAMIL HYDROCHLORIDE 2.5 MG/ML
INJECTION INTRAVENOUS CODE/TRAUMA/SEDATION MEDICATION
Status: DISCONTINUED | OUTPATIENT
Start: 2025-06-16 | End: 2025-06-16 | Stop reason: HOSPADM

## 2025-06-16 RX ORDER — HEPARIN SODIUM 1000 [USP'U]/ML
4000 INJECTION, SOLUTION INTRAVENOUS; SUBCUTANEOUS EVERY 6 HOURS PRN
Status: DISCONTINUED | OUTPATIENT
Start: 2025-06-16 | End: 2025-06-17

## 2025-06-16 RX ORDER — OXYCODONE HYDROCHLORIDE 5 MG/1
5 TABLET ORAL EVERY 6 HOURS PRN
Status: DISCONTINUED | OUTPATIENT
Start: 2025-06-16 | End: 2025-06-17 | Stop reason: HOSPADM

## 2025-06-16 RX ORDER — INSULIN LISPRO 100 [IU]/ML
1-6 INJECTION, SOLUTION INTRAVENOUS; SUBCUTANEOUS
Status: DISCONTINUED | OUTPATIENT
Start: 2025-06-16 | End: 2025-06-17 | Stop reason: HOSPADM

## 2025-06-16 RX ORDER — ASPIRIN 81 MG/1
81 TABLET, CHEWABLE ORAL DAILY
Status: DISCONTINUED | OUTPATIENT
Start: 2025-06-17 | End: 2025-06-17 | Stop reason: HOSPADM

## 2025-06-16 RX ORDER — ONDANSETRON 2 MG/ML
4 INJECTION INTRAMUSCULAR; INTRAVENOUS EVERY 6 HOURS PRN
Status: DISCONTINUED | OUTPATIENT
Start: 2025-06-16 | End: 2025-06-17 | Stop reason: HOSPADM

## 2025-06-16 RX ORDER — LIDOCAINE HYDROCHLORIDE 10 MG/ML
INJECTION, SOLUTION EPIDURAL; INFILTRATION; INTRACAUDAL; PERINEURAL CODE/TRAUMA/SEDATION MEDICATION
Status: DISCONTINUED | OUTPATIENT
Start: 2025-06-16 | End: 2025-06-16 | Stop reason: HOSPADM

## 2025-06-16 RX ORDER — INSULIN LISPRO 100 [IU]/ML
1-5 INJECTION, SOLUTION INTRAVENOUS; SUBCUTANEOUS
Status: DISCONTINUED | OUTPATIENT
Start: 2025-06-16 | End: 2025-06-17 | Stop reason: HOSPADM

## 2025-06-16 RX ORDER — NITROGLYCERIN 0.4 MG/1
0.4 TABLET SUBLINGUAL
Status: DISCONTINUED | OUTPATIENT
Start: 2025-06-16 | End: 2025-06-17 | Stop reason: HOSPADM

## 2025-06-16 RX ORDER — NITROGLYCERIN 20 MG/100ML
INJECTION INTRAVENOUS CODE/TRAUMA/SEDATION MEDICATION
Status: DISCONTINUED | OUTPATIENT
Start: 2025-06-16 | End: 2025-06-16 | Stop reason: HOSPADM

## 2025-06-16 RX ORDER — HYDRALAZINE HYDROCHLORIDE 20 MG/ML
5 INJECTION INTRAMUSCULAR; INTRAVENOUS EVERY 4 HOURS PRN
Status: DISCONTINUED | OUTPATIENT
Start: 2025-06-16 | End: 2025-06-17 | Stop reason: HOSPADM

## 2025-06-16 RX ORDER — HEPARIN SODIUM 1000 [USP'U]/ML
INJECTION, SOLUTION INTRAVENOUS; SUBCUTANEOUS CODE/TRAUMA/SEDATION MEDICATION
Status: DISCONTINUED | OUTPATIENT
Start: 2025-06-16 | End: 2025-06-16 | Stop reason: HOSPADM

## 2025-06-16 RX ORDER — ACETAMINOPHEN 325 MG/1
650 TABLET ORAL EVERY 6 HOURS PRN
Status: DISCONTINUED | OUTPATIENT
Start: 2025-06-16 | End: 2025-06-17 | Stop reason: HOSPADM

## 2025-06-16 RX ORDER — ASPIRIN 81 MG/1
324 TABLET, CHEWABLE ORAL ONCE
Status: COMPLETED | OUTPATIENT
Start: 2025-06-16 | End: 2025-06-16

## 2025-06-16 RX ORDER — SODIUM CHLORIDE 9 MG/ML
150 INJECTION, SOLUTION INTRAVENOUS CONTINUOUS
Status: DISPENSED | OUTPATIENT
Start: 2025-06-16 | End: 2025-06-16

## 2025-06-16 RX ADMIN — NITROGLYCERIN 1 INCH: 20 OINTMENT TOPICAL at 21:20

## 2025-06-16 RX ADMIN — NITROGLYCERIN 1 INCH: 20 OINTMENT TOPICAL at 16:25

## 2025-06-16 RX ADMIN — ONDANSETRON 4 MG: 2 INJECTION INTRAMUSCULAR; INTRAVENOUS at 15:58

## 2025-06-16 RX ADMIN — TICAGRELOR 90 MG: 90 TABLET ORAL at 17:01

## 2025-06-16 RX ADMIN — SODIUM CHLORIDE 150 ML/HR: 0.9 INJECTION, SOLUTION INTRAVENOUS at 16:20

## 2025-06-16 RX ADMIN — INSULIN LISPRO 1 UNITS: 100 INJECTION, SOLUTION INTRAVENOUS; SUBCUTANEOUS at 21:21

## 2025-06-16 RX ADMIN — HEPARIN SODIUM 11.8 UNITS/KG/HR: 10000 INJECTION, SOLUTION INTRAVENOUS at 11:45

## 2025-06-16 RX ADMIN — ASPIRIN 324 MG: 81 TABLET, CHEWABLE ORAL at 11:45

## 2025-06-16 NOTE — ASSESSMENT & PLAN NOTE
Mild.  Normal differential counts.  No other signs and symptoms of an active infection.  Monitor.  For further evaluation and management if this worsens significantly.

## 2025-06-16 NOTE — ED PROVIDER NOTES
"Time reflects when diagnosis was documented in both MDM as applicable and the Disposition within this note       Time User Action Codes Description Comment    6/16/2025 10:37 AM Heber Michael Add [I24.9] Acute coronary syndrome with high troponin (HCC)     6/16/2025 10:37 AM ConchaHeber oneal Add [R07.9] Acute chest pain     6/16/2025 10:38 AM Heber Michael Add [I21.4] NSTEMI (non-ST elevated myocardial infarction) (HCC)     6/16/2025 11:26 AM Martínez Moreno Add [I20.0] Unstable angina pectoris (HCC)     6/16/2025  1:20 PM Rama Prather Modify [I21.4] NSTEMI (non-ST elevated myocardial infarction) ([facility])           ED Disposition       ED Disposition   Admit    Condition   Stable    Date/Time   Mon Jun 16, 2025 10:44 AM    Comment   Case was discussed with Jass and the patient's admission status was agreed to be Admission Status: inpatient status, with Dr. Moreno.                Assessment & Plan       Medical Decision Making  Patient with history as below presented to triage with CC: \"Patient presents with:  Chest Pain: States has been having sharp stabbing chest pain once or twice a day for the last week that occasionally will radiate down arm No sob/dizziness   \"  Hx obtained from pt    77-year-old female with history of CAD status post PCI, HTN, HLD, DM 2 presenting with chest pain since yesterday.  Described as intermittent tightness in the left upper chest with radiation down left arm.  No other associated symptoms.  Hemodynamically stable.  Initial troponin 197, labs otherwise unremarkable.  EKG normal sinus without ischemic changes.  Have started heparin and aspirin due to concern for ACS/NSTEMI. CXR neg.     DDX including but not limited to: ACS, MI, PE, PTX, pneumonia, dissection, pleurisy, pericarditis, myocarditis, rhabdomyolysis, GI etiology, pneumomediastinum, mediastinitis, esophageal rupture.        I have independently ordered, reviewed and interpreted the following: labs and/or imaging and/or EKG " studies listed below  Reviewed external records including notes, and prior labs/imaging results.    Disposition: Patient condition, stable. Discussed need for admission with patient for further management and workup with pt and they are agreeable with plan. Discussed case with [facility] hospitalist , who agreed to admit patient to  tele status.     See ED Course for further MDM.      PLEASE NOTE:  This encounter was completed utilizing the [employer] Direct Speech Voice Recognition Software. Grammatical errors, random word insertions, pronoun errors and incomplete sentences are occasional inherent consequences of the system due to software limitations, ambient noise and hardware issues.These may be missed by proof reading prior to affixing electronic signature. Any questions or concerns about the content, text or information contained within the body of this dictation should be directly addressed to the physician for clarification. Please do not hesitate to call me directly if you have any questions or concerns.     Amount and/or Complexity of Data Reviewed  External Data Reviewed: labs, radiology, ECG and notes.  Labs: ordered. Decision-making details documented in ED Course.  Radiology: ordered and independent interpretation performed. Decision-making details documented in ED Course.  ECG/medicine tests: ordered and independent interpretation performed. Decision-making details documented in ED Course.    Risk  Decision regarding hospitalization.        ED Course as of 06/17/25 0208   Mon Jun 16, 2025   1003 Temperature: 98.2 °F (36.8 °C)   1006 CBC and differential(!)  Very mild leukocytosis.  No anemia.  Platelets WNL.   1026 hs TnI 0hr(!): 197  Will Initiate ACS heparin and admit for further cardiac workup/restratification and management.  Slim to admit to their service.  Patient agreeable with plan.   1028 ECG 12 lead   1029 Comprehensive metabolic panel(!)  No acute electrolyte abnormality.  Renal function  at baseline.  Alk phos 115 and T. bili 1.1.  Otherwise no acute liver function or LFT elevation.   1247 Delta 2hr hsTnI: 12       Medications   heparin (porcine) 25,000 units in 0.45% NaCl 250 mL infusion (premix) (11.8 Units/kg/hr × 85 kg (Order-Specific) Intravenous Restarted 6/16/25 1146)   heparin (porcine) injection 4,000 Units (has no administration in time range)   heparin (porcine) injection 2,000 Units (has no administration in time range)   aspirin chewable tablet 81 mg (has no administration in time range)   atorvastatin (LIPITOR) tablet 80 mg (has no administration in time range)   traMADol (ULTRAM) tablet 50 mg (has no administration in time range)   nitroglycerin (NITROSTAT) SL tablet 0.4 mg (has no administration in time range)   acetaminophen (TYLENOL) tablet 650 mg (has no administration in time range)   oxyCODONE (ROXICODONE) IR tablet 5 mg (has no administration in time range)   morphine injection 2 mg (has no administration in time range)   insulin lispro (HumALOG/ADMELOG) 100 units/mL subcutaneous injection 1-6 Units ( Subcutaneous Not Given 6/16/25 1157)   insulin lispro (HumALOG/ADMELOG) 100 units/mL subcutaneous injection 1-5 Units (has no administration in time range)   hydrALAZINE (APRESOLINE) injection 5 mg (has no administration in time range)   carvedilol (COREG) tablet 12.5 mg (has no administration in time range)   aspirin chewable tablet 324 mg (324 mg Oral Given 6/16/25 1145)       ED Risk Strat Scores   HEART Risk Score      Flowsheet Row Most Recent Value   Heart Score Risk Calculator    History 0 Filed at: 06/16/2025 1033   ECG 1 Filed at: 06/16/2025 1033   Age 2 Filed at: 06/16/2025 1033   Risk Factors 2 Filed at: 06/16/2025 1033   Troponin 2 Filed at: 06/16/2025 1033   HEART Score 7 Filed at: 06/16/2025 1033          HEART Risk Score      Flowsheet Row Most Recent Value   Heart Score Risk Calculator    History 0 Filed at: 06/16/2025 1033   ECG 1 Filed at: 06/16/2025 1033   Age 2  Filed at: 06/16/2025 1033   Risk Factors 2 Filed at: 06/16/2025 1033   Troponin 2 Filed at: 06/16/2025 1033   HEART Score 7 Filed at: 06/16/2025 1033                      No data recorded        SBIRT 20yo+      Flowsheet Row Most Recent Value   Initial Alcohol Screen: US AUDIT-C     1. How often do you have a drink containing alcohol? 0 Filed at: 06/16/2025 1006   2. How many drinks containing alcohol do you have on a typical day you are drinking?  0 Filed at: 06/16/2025 1006   3a. Male UNDER 65: How often do you have five or more drinks on one occasion? 0 Filed at: 06/16/2025 1006   3b. FEMALE Any Age, or MALE 65+: How often do you have 4 or more drinks on one occassion? 0 Filed at: 06/16/2025 1006   Audit-C Score 0 Filed at: 06/16/2025 1006   PACO: How many times in the past year have you...    Used an illegal drug or used a prescription medication for non-medical reasons? Never Filed at: 06/16/2025 1006          NINOSKA Risk Score      Flowsheet Row Most Recent Value   Age >= 65 1 Filed at: 06/16/2025 1120   Known CAD (stenosis >= 50%) 1 Filed at: 06/16/2025 1120   Recent (<=24 hrs) Service Angina 1 Filed at: 06/16/2025 1120   ST Deviation >= 0.5 mm 0 Filed at: 06/16/2025 1120   3+ CAD Risk Factors (FHx, HTN, HLP, DM, Smoker) 1 Filed at: 06/16/2025 1120   Aspirin Use Past 7 Days 1 Filed at: 06/16/2025 1120   Elevated Cardiac Markers 1 Filed at: 06/16/2025 1120   NINOSKA Risk Score (Calculated) 6 Filed at: 06/16/2025 1120                          History of Present Illness       Chief Complaint   Patient presents with    Chest Pain     States has been having sharp stabbing chest pain once or twice a day for the last week that occasionally will radiate down arm   No sob/dizziness        Past Medical History[1]   Past Surgical History[2]   Family History[3]   Social History[4]   E-Cigarette/Vaping      E-Cigarette/Vaping Substances      I have reviewed and agree with the history as documented.     70 y/o female hx of CAD  s/p PCI, HTN, HLD, DM2 presenting with intermittent L sided chest tightness that radiates down left arm. Pain onset last night at 11pm and lasted for less than 10 mins. Onset at rest and was unchanged with exertion. Pain subsided with sleep and currently only has pain when pressing L side of chest. Denies associated N/V, diaphoresis, leg swelling, leg pain, dizziness/lightheadedness, syncope, bacl pain, fever, chill, cough, sob, numbness, or weakness. Former smoker.         Review of Systems   Constitutional:  Negative for chills and fever.   HENT:  Negative for congestion and sore throat.    Eyes:  Negative for photophobia, pain, redness and visual disturbance.   Respiratory:  Positive for chest tightness. Negative for cough and shortness of breath.    Cardiovascular:  Positive for chest pain. Negative for palpitations.   Gastrointestinal:  Negative for abdominal pain, constipation, diarrhea, nausea and vomiting.   Genitourinary:  Negative for difficulty urinating, dysuria, flank pain, frequency, hematuria, pelvic pain, urgency, vaginal bleeding, vaginal discharge and vaginal pain.   Musculoskeletal:  Positive for myalgias. Negative for arthralgias, back pain, neck pain and neck stiffness.   Skin:  Negative for color change and rash.   Neurological:  Negative for dizziness, seizures, syncope, light-headedness, numbness and headaches.   All other systems reviewed and are negative.          Objective       ED Triage Vitals   Temperature Pulse Blood Pressure Respirations SpO2 Patient Position - Orthostatic VS   06/16/25 0942 06/16/25 0942 06/16/25 0942 06/16/25 0942 06/16/25 0942 06/16/25 1600   98.2 °F (36.8 °C) 72 (!) 186/85 18 96 % Lying      Temp Source Heart Rate Source BP Location FiO2 (%) Pain Score    06/16/25 1600 -- 06/16/25 1600 -- 06/16/25 0942    Oral  Left arm  No Pain      Vitals      Date and Time Temp Pulse SpO2 Resp BP Pain Score FACES Pain Rating User   06/17/25 0143 -- 71 96 % -- 128/69 -- -- DII    06/16/25 2145 98.2 °F (36.8 °C) 58 96 % 16 139/69 -- -- AV   06/16/25 2115 -- -- -- -- -- No Pain -- AV   06/16/25 2045 98 °F (36.7 °C) 67 96 % 18 145/72 -- -- AV   06/16/25 1949 -- -- -- -- -- No Pain -- AV   06/16/25 1945 -- 63 -- -- 151/70 -- -- AV   06/16/25 1945 98 °F (36.7 °C) -- 95 % 18 -- -- -- JAL   06/16/25 1933 -- -- -- -- -- No Pain -- AV   06/16/25 1918 -- -- -- -- -- No Pain --    06/16/25 1903 -- -- -- -- -- No Pain --    06/16/25 1845 -- 62 -- -- 143/72 -- -- AV   06/16/25 1845 -- -- 95 % 16 -- -- -- [facility]   06/16/25 1757 -- -- -- -- -- No Pain --    06/16/25 1745 -- 63 98 % -- 149/78 -- -- AV   06/16/25 1740 -- -- 98 % -- -- -- -- [facility]   06/16/25 1727 -- -- -- -- -- No Pain -- AV   06/16/25 1715 -- 61 -- -- 161/79 -- -- AV 06/16/25 1700 -- -- -- -- -- No Pain -- AV   06/16/25 1645 -- 51 -- -- 159/83 No Pain -- AV   06/16/25 1630 -- 53 -- -- 160/84 No Pain -- AV   06/16/25 1615 -- 53 94 % -- 157/75 -- --    06/16/25 1600 97.6 °F (36.4 °C) 59 96 % 16 151/80 No Pain -- AV   06/16/25 1340 -- -- -- -- -- No Pain --    06/16/25 1330 -- 54 99 % -- 185/81 -- --    06/16/25 1300 -- 55 99 % -- 187/82 -- --    06/16/25 1200 -- 55 99 % -- 198/91 -- --    06/16/25 1145 -- 56 97 % -- -- No Pain --    06/16/25 1000 -- 65 97 % -- 178/90 -- --    06/16/25 0942 98.2 °F (36.8 °C) 72 96 % 18 186/85 No Pain -- AP            Physical Exam  Vitals and nursing note reviewed.   Constitutional:       General: She is not in acute distress.     Appearance: She is well-developed. She is obese. She is not toxic-appearing.   HENT:      Head: Normocephalic and atraumatic.      Right Ear: External ear normal.      Left Ear: External ear normal.      Nose: Nose normal. No congestion.      Mouth/Throat:      Mouth: Mucous membranes are moist.      Pharynx: Oropharynx is clear. No oropharyngeal exudate or posterior oropharyngeal erythema.     Eyes:      Extraocular Movements: Extraocular movements intact.       Conjunctiva/sclera: Conjunctivae normal.      Pupils: Pupils are equal, round, and reactive to light.       Cardiovascular:      Rate and Rhythm: Normal rate and regular rhythm.      Pulses: Normal pulses.      Heart sounds: Normal heart sounds. No murmur heard.  Pulmonary:      Effort: Pulmonary effort is normal. No respiratory distress.      Breath sounds: Normal breath sounds. No stridor. No wheezing, rhonchi or rales.   Chest:      Chest wall: Tenderness present.   Abdominal:      General: Bowel sounds are normal.      Palpations: Abdomen is soft.      Tenderness: There is no abdominal tenderness. There is no right CVA tenderness, left CVA tenderness, guarding or rebound.     Musculoskeletal:         General: No swelling, tenderness or deformity.      Cervical back: Normal range of motion and neck supple. No rigidity or tenderness.      Right lower leg: No edema.      Left lower leg: No edema.   Lymphadenopathy:      Cervical: No cervical adenopathy.     Skin:     General: Skin is warm and dry.      Capillary Refill: Capillary refill takes less than 2 seconds.      Coloration: Skin is not jaundiced or pale.      Findings: No bruising, erythema, lesion or rash.     Neurological:      General: No focal deficit present.      Mental Status: She is alert and oriented to person, place, and time. Mental status is at baseline.      GCS: GCS eye subscore is 4. GCS verbal subscore is 5. GCS motor subscore is 6.      Cranial Nerves: Cranial nerves 2-12 are intact. No cranial nerve deficit, dysarthria or facial asymmetry.      Sensory: Sensation is intact. No sensory deficit.      Motor: Motor function is intact. No weakness.      Coordination: Coordination is intact. Coordination normal.      Gait: Gait is intact.     Psychiatric:         Mood and Affect: Mood normal.         Behavior: Behavior normal.         Thought Content: Thought content normal.         Results Reviewed       Procedure Component Value Units Date/Time     Hemoglobin A1c w/EAG Estimation (Prechecked if no A1C within 90 days) [[id number]]  (Abnormal) Collected: 06/16/25 0954    Lab Status: Final result Specimen: Blood from Arm, Right Updated: 06/16/25 1817     Hemoglobin A1C 7.1 %       mg/dl     HS Troponin I 4hr [[id number]]  (Abnormal) Collected: 06/16/25 1356    Lab Status: Final result Specimen: Blood from Arm, Right Updated: 06/16/25 1424     hs TnI 4hr 218 ng/L      Delta 4hr hsTnI 21 ng/L     Fingerstick Glucose (POCT) [[id number]]  (Normal) Collected: 06/16/25 1353    Lab Status: Final result Specimen: Blood Updated: 06/16/25 1354     POC Glucose 87 mg/dl     HS Troponin I 2hr [[id number]]  (Abnormal) Collected: 06/16/25 1210    Lab Status: Final result Specimen: Blood from Arm, Right Updated: 06/16/25 1247     hs TnI 2hr 209 ng/L      Delta 2hr hsTnI 12 ng/L     Protime-INR [[id number]]  (Normal) Collected: 06/16/25 0954    Lab Status: Final result Specimen: Blood from Arm, Right Updated: 06/16/25 1103     Protime 13.3 seconds      INR 0.95    Narrative:      INR Therapeutic Range    Indication                                             INR Range      Atrial Fibrillation                                               2.0-3.0  Hypercoagulable State                                    2.0.2.3  Left Ventricular Asist Device                            2.0-3.0  Mechanical Heart Valve                                  -    Aortic(with afib, MI, embolism, HF, LA enlargement,    and/or coagulopathy)                                     2.0-3.0 (2.5-3.5)     Mitral                                                             2.5-3.5  Prosthetic/Bioprosthetic Heart Valve               2.0-3.0  Venous thromboembolism (VTE: VT, PE        2.0-3.0    APTT [[id number]]  (Normal) Collected: 06/16/25 0954    Lab Status: Final result Specimen: Blood from Arm, Right Updated: 06/16/25 1103     PTT 25 seconds     [place] draw [[id number]] Collected: 06/16/25 0954    Lab Status:  Final result Specimen: Blood from Arm, Right Updated: 06/16/25 1101    Narrative:      The following orders were created for panel order [place] draw.  Procedure                               Abnormality         Status                     ---------                               -----------         ------                     Gold top on hold[[id number]]                                 Final result               Green / Black tube on hold[[id number]]                       Final result                 Please view results for these tests on the individual orders.    HS Troponin 0hr (reflex protocol) [[id number]]  (Abnormal) Collected: 06/16/25 0954    Lab Status: Final result Specimen: Blood from Arm, Right Updated: 06/16/25 1026     hs TnI 0hr 197 ng/L     Comprehensive metabolic panel [[id number]]  (Abnormal) Collected: 06/16/25 0954    Lab Status: Final result Specimen: Blood from Arm, Right Updated: 06/16/25 1024     Sodium 140 mmol/L      Potassium 4.2 mmol/L      Chloride 104 mmol/L      CO2 32 mmol/L      ANION GAP 4 mmol/L      BUN 19 mg/dL      Creatinine 0.95 mg/dL      Glucose 95 mg/dL      Calcium 8.6 mg/dL      AST 18 U/L      ALT 15 U/L      Alkaline Phosphatase 115 U/L      Total Protein 7.1 g/dL      Albumin 3.7 g/dL      Total Bilirubin 1.10 mg/dL      eGFR 61 ml/min/1.73sq m     Narrative:      National Kid[employer] Disease Foundation guidelines for Chronic Kidney Disease (CKD):     Stage 1 with normal or high GFR (GFR > 90 mL/min/1.73 square meters)    Stage 2 Mild CKD (GFR = 60-89 mL/min/1.73 square meters)    Stage 3A Moderate CKD (GFR = 45-59 mL/min/1.73 square meters)    Stage 3B Moderate CKD (GFR = 30-44 mL/min/1.73 square meters)    Stage 4 Severe CKD (GFR = 15-29 mL/min/1.73 square meters)    Stage 5 End Stage CKD (GFR <15 mL/min/1.73 square meters)  Note: GFR calculation is accurate only with a steady state creatinine    CBC and differential [[id number]]  (Abnormal) Collected: 06/16/25 0954    Lab  Status: Final result Specimen: Blood from Arm, Right Updated: 06/16/25 1004     WBC 10.69 Thousand/uL      RBC 5.03 Million/uL      Hemoglobin 14.0 g/dL      Hematocrit 43.0 %      MCV 86 fL      MCH 27.8 pg      MCHC 32.6 g/dL      RDW 13.0 %      MPV 11.5 fL      Platelets 165 Thousands/uL      nRBC 0 /100 WBCs      Segmented % 60 %      Immature Grans % 0 %      Lymphocytes % 30 %      Monocytes % 7 %      Eosinophils Relative 2 %      Basophils Relative 1 %      Absolute Neutrophils 6.54 Thousands/µL      Absolute Immature Grans 0.03 Thousand/uL      Absolute Lymphocytes 3.16 Thousands/µL      Absolute Monocytes 0.69 Thousand/µL      Eosinophils Absolute 0.21 Thousand/µL      Basophils Absolute 0.06 Thousands/µL             XR chest 2 views   ED Interpretation by Heber Michael DO (06/16 1013)   No acute cardiopulmonary process.  Compared to prior chest x-ray done on 12/19/2019.      Final Interpretation by Luiza Dominguez MD (06/16 1244)      No acute cardiopulmonary disease.            Workstation performed: [id number]             ECG 12 Lead Documentation Only    Date/Time: 6/16/2025 10:01 AM    Performed by: Heber Michael DO  Authorized by: Heber Michael DO    Indications / Diagnosis:  Chest pain.  ECG reviewed by me, the ED Provider: yes    Patient location:  ED  Previous ECG:     Previous ECG:  Compared to current    Comparison ECG info:  December 19, 2019.    Similarity:  Changes noted  Interpretation:     Interpretation: non-specific    Rate:     ECG rate:  68.    ECG rate assessment: normal    Rhythm:     Rhythm: sinus rhythm    Ectopy:     Ectopy: none    QRS:     QRS axis:  Normal    QRS intervals:  Normal  Conduction:     Conduction: normal    ST segments:     ST segments:  Non-specific  T waves:     T waves: normal        ED Medication and Procedure Management   Prior to Admission Medications   Prescriptions Last Dose Informant Patient Reported? Taking?   aspirin 81 MG tablet 6/16/2025  Yes Yes    Sig: Take 81 mg by mouth in the morning.   atorvastatin (LIPITOR) 80 mg tablet 6/16/2025  No Yes   Sig: Take 1 tablet (80 mg total) by mouth daily   metoprolol succinate (TOPROL-XL) 50 mg 24 hr tablet 6/16/2025  No Yes   Sig: Take 1 tablet (50 mg total) by mouth daily   traMADol (ULTRAM) 50 mg tablet 6/16/2025  Yes Yes   Sig: Take 50 mg by mouth every 6 (six) hours as needed for moderate pain      Facility-Administered Medications: None     Current Discharge Medication List        CONTINUE these medications which have NOT CHANGED    Details   aspirin 81 MG tablet Take 81 mg by mouth in the morning.      atorvastatin (LIPITOR) 80 mg tablet Take 1 tablet (80 mg total) by mouth daily  Refills: 0    Associated Diagnoses: Coronary artery disease involving native heart without angina pectoris, unspecified vessel or lesion type      metoprolol succinate (TOPROL-XL) 50 mg 24 hr tablet Take 1 tablet (50 mg total) by mouth daily  Refills: 0    Associated Diagnoses: Essential hypertension      traMADol (ULTRAM) 50 mg tablet Take 50 mg by mouth every 6 (six) hours as needed for moderate pain           No discharge procedures on file.  ED SEPSIS DOCUMENTATION   Time reflects when diagnosis was documented in both MDM as applicable and the Disposition within this note       Time User Action Codes Description Comment    6/16/2025 10:37 AM Heber Michael Add [I24.9] Acute coronary syndrome with high troponin (HCC)     6/16/2025 10:37 AM Heber Michael Add [R07.9] Acute chest pain     6/16/2025 10:38 AM Heber Michael Add [I21.4] NSTEMI (non-ST elevated myocardial infarction) (HCC)     6/16/2025 11:26 AM Martínez Moreno Add [I20.0] Unstable angina pectoris (HCC)     6/16/2025  1:20 PM Rama Prather Modify [I21.4] NSTEMI (non-ST elevated myocardial infarction) (HCC)                    [1]   Past Medical History:  Diagnosis Date    Cardiac disease     Diabetes mellitus (HCC)     Dyslipidemia     Hypertension    [2]   Past Surgical  History:  Procedure Laterality Date    CORONARY STENT PLACEMENT     [3] No family history on file.  [4]   Social History  Tobacco Use    Smoking status: Former    Smokeless tobacco: Never   Substance Use Topics    Alcohol use: Not Currently    Drug use: No        Heber Michael DO  06/17/25 0208

## 2025-06-16 NOTE — ASSESSMENT & PLAN NOTE
Lipid profile 2024-total cholesterol 130, HDL 51, triglycerides 123, LDL 54  Taking atorvastatin 40 mg daily

## 2025-06-16 NOTE — ASSESSMENT & PLAN NOTE
Poorly controlled at this time.  According to the patient, he already took her medications today including her Toprol-XL.  Continue patient's Toprol-XL.  IV blood pressure medication on as-needed basis.  Monitor blood pressures.  Adjust treatment accordingly.

## 2025-06-16 NOTE — CONSULTS
Consultation - Cardiology Team One  Patricia Singleton 69 y.o. female MRN: [id number]  Unit/Bed#: ED-26 Encounter: [id number]    Inpatient consult to Cardiology  Consult performed by: GAYLE Alejandre  Consult ordered by: Martínez Moreno MD          Physician Requesting Consult: Martínez Navarro*  Reason for Consult / Principal Problem: Chest pain, elevated troponin    Assessment & Plan  Chest pain with elevated troponin/ACS  1 week history of sharp chest pain with intermittent radiation to her left arm.  Similar to symptoms prior to MI  Episodes only lasting for a couple of minutes before spontaneously resolving on their own  Troponin 197  ECG: Normal sinus rhythm with nonspecific T wave abnormality  Started on IV heparin  Currently chest pain-free  CAD (coronary artery disease)  History of inferior MI 2015 s/p PCI to 100% mid RCA occlusion.    Minor luminal irregularities noted in other vessels at the time of catheterization  LVEF preserved at time of MI  Nuclear stress test 2019 without ischemia  Maintained on aspirin, statin, and beta-blocker  Hypertension  Uncontrolled in the hospital, 198/91  Per chart review, normally 130-140/80s at PCP  Taking Toprol XL 50 mg daily, took dose today  Hyperlipidemia  Lipid profile 2024-total cholesterol 130, HDL 51, triglycerides 123, LDL 54  Taking atorvastatin 40 mg daily  Type 2 diabetes mellitus without complication, without long-term current use of insulin ([facility])  A1c 6.8% in 2024  Repeat A1c pending    Plan/recommendations  Change Toprol-XL to carvedilol 12.5 mg p.o. twice daily with dose this afternoon  PRN hydralazine or labetalol for SBP greater than 180 mmHg  Check echocardiogram  Follow-up 2 and 4-hour troponin  Suspect she will require cardiac catheterization; timing to be determined.  Keep n.p.o. for now  Continue statin, aspirin, IV heparin  Continue telemetry monitoring    History of Present Illness   HPI: Patricia Singleton is a 69 y.o. year old  female with CAD with history of inferior STEMI 2015 s/p PCI/SORAIDA to RCA, HTN, HLD, type 2 diabetes, and obesity.  She does not follow regularly with a cardiologist.    She presented to the hospital today with 1 week history of intermittent chest pain that is occurring primarily at rest.  She describes it as a sharp pain similar to her previous MI with occasional radiation into her left arm with tingling in her hand.  She denies any recent viral illness, shortness of breath, lower extremity edema, nausea, vomiting, or diaphoresis.  Blood pressure in the ED is significantly elevated 198/91 at time of my exam.  Per review of her most recent PCP office visits, her blood pressure is usually more controlled in the 130s/80s.  ECG shows sinus rhythm with nonspecific T wave abnormality.  Cardiology consulted for further evaluation and management of her chest pain with elevated troponin.    EKG reviewed personally: Sinus rhythm with nonspecific T wave abnormality    Telemetry reviewed personally: NSR and sinus bradycardia in the 50s    Review of Systems   Constitutional: Negative for chills, malaise/fatigue and weight gain.   Cardiovascular:  Positive for chest pain. Negative for dyspnea on exertion, leg swelling, orthopnea, palpitations and syncope.   Respiratory:  Negative for cough, shortness of breath, sleep disturbances due to breathing and sputum production.    Endocrine: Negative.    Hematologic/Lymphatic: Negative.    Gastrointestinal:  Negative for bloating and nausea.   Genitourinary: Negative.    Neurological:  Negative for dizziness, light-headedness and weakness.   Psychiatric/Behavioral:  Negative for altered mental status.    All other systems reviewed and are negative.    Historical Information   Past Medical History[1]  Past Surgical History[2]  Social History     Substance and Sexual Activity   Alcohol Use No     Social History     Substance and Sexual Activity   Drug Use No     Tobacco Use History[3]  Family  "History: Family history non-contributory    Meds/Allergies   all current active meds have been reviewed and current meds:   Current Facility-Administered Medications:     acetaminophen (TYLENOL) tablet 650 mg, Q6H PRN    [START ON 2025] aspirin chewable tablet 81 mg, Daily    [START ON 2025] atorvastatin (LIPITOR) tablet 80 mg, Daily    carvedilol (COREG) tablet 12.5 mg, BID With Meals    heparin (porcine) 25,000 units in 0.45% NaCl 250 mL infusion (premix), Titrated, Last Rate: 11.8 Units/kg/hr (25 1146)    heparin (porcine) injection 2,000 Units, Q6H PRN    heparin (porcine) injection 4,000 Units, Q6H PRN    hydrALAZINE (APRESOLINE) injection 5 mg, Q4H PRN    insulin lispro (HumALOG/ADMELOG) 100 units/mL subcutaneous injection 1-5 Units, HS    insulin lispro (HumALOG/ADMELOG) 100 units/mL subcutaneous injection 1-6 Units, TID AC **AND** Fingerstick Glucose (POCT), TID AC    morphine injection 2 mg, Q4H PRN    nitroglycerin (NITROSTAT) SL tablet 0.4 mg, Q5 Min PRN    oxyCODONE (ROXICODONE) IR tablet 5 mg, Q6H PRN    traMADol (ULTRAM) tablet 50 mg, Q6H PRN  heparin (porcine), 3-20 Units/kg/hr (Order-Specific), Last Rate: 11.8 Units/kg/hr (25 1145)        Allergies[4]    Objective   Vitals: Blood pressure (!) 178/90, pulse 65, temperature 98.2 °F (36.8 °C), resp. rate 18, height 5' 6\" (1.676 m), weight 89 kg (196 lb 3.4 oz), SpO2 97%., Body mass index is 31.67 kg/m².,     Systolic (24hrs), Av , Min:178 , Max:186     Diastolic (24hrs), Av, Min:85, Max:90      No intake or output data in the 24 hours ending 25 1149  Wt Readings from Last 3 Encounters:   25 89 kg (196 lb 3.4 oz)   19 89.4 kg (197 lb)   17 88 kg (194 lb)     Invasive Devices       Peripheral Intravenous Line  Duration             Peripheral IV 25 Proximal;Right;Ventral (anterior) Forearm <1 day                    Physical Exam  Vitals reviewed.   Constitutional:       General: She is not in " acute distress.     Appearance: She is obese.   Neck:      Vascular: No hepatojugular reflux or JVD.     Cardiovascular:      Rate and Rhythm: Regular rhythm. Bradycardia present.      Pulses: Normal pulses.      Heart sounds: Normal heart sounds. No murmur heard.     No friction rub. No gallop.   Pulmonary:      Effort: Pulmonary effort is normal. No respiratory distress.      Breath sounds: No rales.      Comments: Room air  Abdominal:      General: Bowel sounds are normal. There is no distension.      Palpations: Abdomen is soft.      Tenderness: There is no abdominal tenderness.     Musculoskeletal:         General: No tenderness. Normal range of motion.      Cervical back: Neck supple.      Right lower leg: Edema (trace) present.      Left lower leg: Edema (trace) present.     Skin:     General: Skin is warm and dry.      Capillary Refill: Capillary refill takes less than 2 seconds.      Findings: No erythema.     Neurological:      Mental Status: She is alert and oriented to person, place, and time.     Psychiatric:         Mood and Affect: Mood normal.         LABORATORY RESULTS:      CBC with diff:   Results from last 7 days   Lab Units 06/16/25  0954   WBC Thousand/uL 10.69*   HEMOGLOBIN g/dL 14.0   HEMATOCRIT % 43.0   MCV fL 86   PLATELETS Thousands/uL 165   RBC Million/uL 5.03   MCH pg 27.8   MCHC g/dL 32.6   RDW % 13.0   MPV fL 11.5   NRBC AUTO /100 WBCs 0       CMP:  Results from last 7 days   Lab Units 06/16/25  0954   POTASSIUM mmol/L 4.2   CHLORIDE mmol/L 104   CO2 mmol/L 32   BUN mg/dL 19   CREATININE mg/dL 0.95   CALCIUM mg/dL 8.6   AST U/L 18   ALT U/L 15   ALK PHOS U/L 115*   EGFR ml/min/1.73sq m 61       BMP:  Results from last 7 days   Lab Units 06/16/25  0954   POTASSIUM mmol/L 4.2   CHLORIDE mmol/L 104   CO2 mmol/L 32   BUN mg/dL 19   CREATININE mg/dL 0.95   CALCIUM mg/dL 8.6       Lab Results   Component Value Date    CREATININE 0.95 06/16/2025    CREATININE 0.88 12/19/2019    CREATININE  "0.81 12/01/2015       No results found for: \"NTBNP\"                         Results from last 7 days   Lab Units 06/16/25  0954   INR  0.95     Lipid Profile:   Lab Results   Component Value Date    CHOL 152 09/29/2015    CHOL 169 09/28/2015     Lab Results   Component Value Date    HDL 42 09/29/2015    HDL 42 09/28/2015     Lab Results   Component Value Date    LDLCALC 87 09/29/2015    LDLCALC 96 09/28/2015     Lab Results   Component Value Date    TRIG 113 09/29/2015    TRIG 155 09/28/2015       Cardiac testing:   No results found for this or any previous visit.    No results found for this or any previous visit.    No valid procedures specified.  No results found for this or any previous visit.      Imaging: Results Review Statement: I reviewed radiology reports from this admission including: chest xray.  No results found.    Assessment  Principal Problem:    Chest pain with elevated troponin, possible NSTEMI  Active Problems:    Hypertension    Hyperlipidemia    CAD (coronary artery disease)    Type 2 diabetes mellitus without complication, without long-term current use of insulin (HCC)    Leukocytosis      Thank you for allowing us to participate in this patient's care. This pt will follow up with          once discharged.     Counseling / Coordination of Care  Total floor / unit time spent today 45 minutes.  Greater than 50% of total time was spent with the patient and / or family counseling and / or coordination of care.  A description of the counseling / coordination of care: Review of history, current assessment, development of a plan.    Code Status: Level 1 - Full Code    ** Please Note: Dragon 360 Dictation voice to text software may have been used in the creation of this document. **         [1]   Past Medical History:  Diagnosis Date    Cardiac disease     Diabetes mellitus (HCC)     Dyslipidemia     Hypertension    [2]   Past Surgical History:  Procedure Laterality Date    CORONARY STENT PLACEMENT   "   [3]   Social History  Tobacco Use   Smoking Status Former   Smokeless Tobacco Never   [4]   Allergies  Allergen Reactions    Penicillins Anaphylaxis     Hives and throat closes. No epi pen or medical alert bracelet

## 2025-06-16 NOTE — ASSESSMENT & PLAN NOTE
Uncontrolled in the hospital, 198/91  Per chart review, normally 130-140/80s at PCP  Taking Toprol XL 50 mg daily, took dose today

## 2025-06-16 NOTE — Clinical Note
Case was discussed with Jass and the patient's admission status was agreed to be Admission Status: inpatient status.

## 2025-06-16 NOTE — ASSESSMENT & PLAN NOTE
1 week history of sharp chest pain with intermittent radiation to her left arm.  Similar to symptoms prior to MI  Episodes only lasting for a couple of minutes before spontaneously resolving on their own  Troponin 197  ECG: Normal sinus rhythm with nonspecific T wave abnormality  Started on IV heparin  Currently chest pain-free

## 2025-06-16 NOTE — H&P
H&P - Hospitalist   Name: Patricia Singleton 69 y.o. female I MRN: [id number]  Unit/Bed#: ED-26 I Date of Admission: 6/16/2025   Date of Service: 6/16/2025 I Hospital Day: 0     Assessment & Plan  Chest pain with elevated troponin/ACS  Possible NSTEMI versus unstable angina.  Patient had been having occasional chest pains for 1 week.  Last chest pain episode was last night.  Chest pain would last approximately 1 to 2 minutes.  Pain was described to be stabbing in character, at the left anterior chest area.  There were 2 occasions that the pain radiated to her left arm.  No shortness of breath.  No diaphoresis.  Patient called her primary care physician this morning and was told to go to the nearest emergency room for further evaluation and management.  History of coronary artery disease with PCI, she told me approximately 10 years ago.  History of cardiovascular risk factors including hypertension, dyslipidemia and diabetes mellitus.  NINOSKA score of 6.  First troponin remain was elevated at 197.  Follow-up results of the 2nd and 4th hour troponins.  EKG: Revealed no acute ischemic changes.  On my reading, revealed normal sinus rhythm 68/min, with some artifacts, nonspecific T wave  abnormalities, no significant change as compared to previous EKG dated 12/19/2019.  Follow-up official results.  Chest x-ray, on my preliminary reading, no acute cardiopulmonary abnormality.  Follow-up official results  Telemetry.  Full dose of aspirin 324 mg x 1 dose given in the emergency room.  Continue with patient's aspirin 81 mg once a day, starting tomorrow.  IV heparin drip, ACS protocol, ordered in the emergency room, continue.  Cardiology consult.  Continue patient's Toprol-XL, and statin medication.  Manage poorly controlled hypertension.  Nitroglycerin on an as-needed basis.  Pain management on as-needed basis.  Oxygen as needed.  Echocardiogram.  Cardiac diet.  N.p.o. postmidnight, for possible cardiac catheterization tomorrow,  "6/17.  CAD (coronary artery disease)  With PCI/stent.  Continue cardiovascular medications.  Please see plans under chest pain.  Hypertension  Poorly controlled at this time.  According to the patient, he already took her medications today including her Toprol-XL.  Continue patient's Toprol-XL.  IV blood pressure medication on as-needed basis.  Monitor blood pressures.  Adjust treatment accordingly.  Hyperlipidemia  Check lipid profile.  Continue patient's atorvastatin.  Type 2 diabetes mellitus without complication, without long-term current use of insulin ([facility])  No results found for: \"HGBA1C\"    No results for input(s): \"POCGLU\" in the last 72 hours.    Blood Sugar Average: Last 72 hrs:    Check A1c level.  According to the patient, she takes glimepiride at home, hold off for now while in the hospital.  Insulin sliding scale.    Monitor blood sugars.  Send from cardiac diet, also for diabetic diet  Adjust treatment accordingly.  Hypoglycemia protocol.  Leukocytosis  Mild.  Normal differential counts.  No other signs and symptoms of an active infection.  Monitor.  For further evaluation and management if this worsens significantly.      VTE Pharmacologic Prophylaxis: VTE Score: 4 Moderate Risk (Score 3-4) - Pharmacological DVT Prophylaxis Ordered: heparin drip.  Code Status: Level 1 - Full Code   Discussion with family: Patient declined call to .     Anticipated Length of Stay: Patient will be admitted on an inpatient basis with an anticipated length of stay of greater than 2 midnights secondary to evaluation management of acute coronary syndrome (chest pain with elevated troponin).    History of Present Illness   Chief Complaint: Chest pain.    Patricia Singleton is a 69 y.o. female with a PMH significant for hypertension, dyslipidemia, diabetes mellitus, coronary artery disease status post PCI with stent who presents with chest pain.  According to the patient, she started having intermittent chest pains " for about a week now.  According to her, she would have at least 2 episodes of chest pain seen at the for about a week now.  She described the pain to be stabbing in character, intensity 7-8/10, localized at the left anterior chest area, with 2 episodes that the pain radiated to her left arm.  Her last chest pain episode was last night, none today so far.  She called her primary care physician today and was told to go to the nearest emergency room for further evaluation and management.  Patient denied any other symptoms like shortness of breath, dizziness, or any nausea or vomiting.  Patient denied being cold and clammy (diaphoresis).  Patient denied any loss of consciousness.    Review of Systems    10 point review of systems done and they were negative except for the ones I mentioned in the history of present illness.  Patient denied any headaches.  Patient denied any fever, chills or any cough or colds.  Patient denied any abdominal pains.  Patient denied any bowel movement problems or any urinary problems or symptoms.  For the other symptoms, please see notes above.    Historical Information   Past Medical History[1]  Past Surgical History[2]  Social History[3]  E-Cigarette/Vaping     E-Cigarette/Vaping Substances     Family History[4]  Social History:  Marital Status: /Civil Union   Patient Pre-hospital Living Situation: Home  Patient Pre-hospital Level of Mobility: walks    Meds/Allergies   I have reviewed home medications using recent Epic encounter.  I also reviewed patient's medication with the patient herself.  In addition to the medications below, she told me, that she also takes glimepiride for her diabetes mellitus type 2.    Prior to Admission medications    Medication Sig Start Date End Date Taking? Authorizing Provider   aspirin 81 MG tablet Take 81 mg by mouth daily.    Historical Provider, MD   atorvastatin (LIPITOR) 80 mg tablet Take 1 tablet (80 mg total) by mouth daily 12/20/19   Ijeoma  DO Hortencia   metoprolol succinate (TOPROL-XL) 50 mg 24 hr tablet Take 1 tablet (50 mg total) by mouth daily 12/21/19   Ijeoma Burnett DO   traMADol (ULTRAM) 50 mg tablet Take 50 mg by mouth every 6 (six) hours as needed for moderate pain.    Historical Provider, MD     Allergies   Allergen Reactions    Penicillins Anaphylaxis     Hives and throat closes. No epi pen or medical alert bracelet       Objective :  Temp:  [98.2 °F (36.8 °C)] 98.2 °F (36.8 °C)  HR:  [55-72] 55  BP: (178-198)/(85-91) 198/91  Resp:  [18] 18  SpO2:  [96 %-99 %] 99 %    Physical Exam  Vitals and nursing note reviewed.   Constitutional:       General: She is not in acute distress.     Appearance: Normal appearance. She is not ill-appearing, toxic-appearing or diaphoretic.   HENT:      Head: Normocephalic and atraumatic.      Mouth/Throat:      Mouth: Mucous membranes are moist.      Pharynx: Oropharynx is clear. No oropharyngeal exudate or posterior oropharyngeal erythema.      Comments: Patient with upper dentures.    Eyes:      General: No scleral icterus.        Right eye: No discharge.         Left eye: No discharge.      Conjunctiva/sclera: Conjunctivae normal.       Cardiovascular:      Rate and Rhythm: Normal rate and regular rhythm.      Heart sounds: Normal heart sounds. No murmur heard.     No friction rub. No gallop.   Pulmonary:      Effort: Pulmonary effort is normal. No respiratory distress.      Breath sounds: Normal breath sounds. No stridor. No wheezing, rhonchi or rales.   Chest:      Chest wall: No tenderness.   Abdominal:      General: There is no distension.      Palpations: Abdomen is soft.      Tenderness: There is no abdominal tenderness. There is no guarding.     Musculoskeletal:      Right lower leg: No edema.      Left lower leg: No edema.     Skin:     General: Skin is warm.      Coloration: Skin is not pale.      Findings: No erythema or rash.     Neurological:      General: No focal deficit present.      Mental  "Status: She is alert and oriented to person, place, and time.     Psychiatric:         Mood and Affect: Mood normal.         Behavior: Behavior normal.         Thought Content: Thought content normal.            Lines/Drains:            Lab Results: I have reviewed the following results:  Results from last 7 days   Lab Units 06/16/25  0954   WBC Thousand/uL 10.69*   HEMOGLOBIN g/dL 14.0   HEMATOCRIT % 43.0   PLATELETS Thousands/uL 165   SEGS PCT % 60   LYMPHO PCT % 30   MONO PCT % 7   EOS PCT % 2     Results from last 7 days   Lab Units 06/16/25  0954   SODIUM mmol/L 140   POTASSIUM mmol/L 4.2   CHLORIDE mmol/L 104   CO2 mmol/L 32   BUN mg/dL 19   CREATININE mg/dL 0.95   ANION GAP mmol/L 4   CALCIUM mg/dL 8.6   ALBUMIN g/dL 3.7   TOTAL BILIRUBIN mg/dL 1.10*   ALK PHOS U/L 115*   ALT U/L 15   AST U/L 18   GLUCOSE RANDOM mg/dL 95     Results from last 7 days   Lab Units 06/16/25  0954   INR  0.95         No results found for: \"HGBA1C\"        Imaging Results Review: I personally reviewed the following image studies in PACS and associated radiology reports: chest xray. My interpretation of the radiology images/reports is: No acute cardiopulmonary abnormalities.  Follow-up official results..  Other Study Results Review: EKG was personally reviewed and my interpretation is: NSR. HR 68/min, with some artifacts,  nonspecific T wave abnormalities, with no significant changes as compared to my review of previous EKG dated 12/19/2019.  Follow-up official results...    Administrative Statements       ** Please Note: This note has been constructed using a voice recognition system. **         [1]   Past Medical History:  Diagnosis Date    Cardiac disease     Diabetes mellitus (HCC)     Dyslipidemia     Hypertension    [2]   Past Surgical History:  Procedure Laterality Date    CORONARY STENT PLACEMENT     [3]   Social History  Tobacco Use    Smoking status: Former    Smokeless tobacco: Never   Substance and Sexual Activity    " Alcohol use: No    Drug use: No   [4] No family history on file.

## 2025-06-16 NOTE — PLAN OF CARE
Problem: Potential for Falls  Goal: Patient will remain free of falls  Description: INTERVENTIONS:  - Educate patient/family on patient safety including physical limitations  - Instruct patient to call for assistance with activity   - Consider consulting OT/PT to assist with strengthening/mobility based on AM PAC & JH-HLM score  - Consult OT/PT to assist with strengthening/mobility   - Keep Call bell within reach  - Keep bed low and locked with side rails adjusted as appropriate  - Keep care items and personal belongings within reach  - Initiate and maintain comfort rounds  Outcome: Progressing     Problem: PAIN - ADULT  Goal: Verbalizes/displays adequate comfort level or baseline comfort level  Description: Interventions:  - Encourage patient to monitor pain and request assistance  - Assess pain using appropriate pain scale  - Administer analgesics as ordered based on type and severity of pain and evaluate response  - Implement non-pharmacological measures as appropriate and evaluate response  - Consider cultural and social influences on pain and pain management  - Notify physician/advanced practitioner if interventions unsuccessful or patient reports new pain  - Educate patient/family on pain management process including their role and importance of  reporting pain   - Provide non-pharmacologic/complimentary pain relief interventions  Outcome: Progressing     Problem: INFECTION - ADULT  Goal: Absence or prevention of progression during hospitalization  Description: INTERVENTIONS:  - Assess and monitor for signs and symptoms of infection  - Monitor lab/diagnostic results  - Monitor all insertion sites, i.e. indwelling lines, tubes, and drains  - Monitor endotracheal if appropriate and nasal secretions for changes in amount and color  - [place] appropriate cooling/warming therapies per order  - Administer medications as ordered  - Instruct and encourage patient and family to use good hand hygiene technique  -  Identify and instruct in appropriate isolation precautions for identified infection/condition  Outcome: Progressing     Problem: DISCHARGE PLANNING  Goal: Discharge to home or other facility with appropriate resources  Description: INTERVENTIONS:  - Identify barriers to discharge w/patient and caregiver  - Arrange for needed discharge resources and transportation as appropriate  - Identify discharge learning needs (meds, wound care, etc.)  - Arrange for interpretive services to assist at discharge as needed  - Refer to Case Management Department for coordinating discharge planning if the patient needs post-hospital services based on physician/advanced practitioner order or complex needs related to functional status, cognitive ability, or social support system  Outcome: Progressing     Problem: Knowledge Deficit  Goal: Patient/family/caregiver demonstrates understanding of disease process, treatment plan, medications, and discharge instructions  Description: Complete learning assessment and assess knowledge base.  Interventions:  - Provide teaching at level of understanding  - Provide teaching via preferred learning methods  Outcome: Progressing

## 2025-06-16 NOTE — ASSESSMENT & PLAN NOTE
Possible NSTEMI versus unstable angina.  Patient had been having occasional chest pains for 1 week.  Last chest pain episode was last night.  Chest pain would last approximately 1 to 2 minutes.  Pain was described to be stabbing in character, at the left anterior chest area.  There were 2 occasions that the pain radiated to her left arm.  No shortness of breath.  No diaphoresis.  Patient called her primary care physician this morning and was told to go to the nearest emergency room for further evaluation and management.  History of coronary artery disease with PCI, she told me approximately 10 years ago.  History of cardiovascular risk factors including hypertension, dyslipidemia and diabetes mellitus.  NINOSKA score of 6.  First troponin remain was elevated at 197.  Follow-up results of the 2nd and 4th hour troponins.  EKG: Revealed no acute ischemic changes.  On my reading, revealed normal sinus rhythm 68/min, with some artifacts, nonspecific T wave  abnormalities, no significant change as compared to previous EKG dated 12/19/2019.  Follow-up official results.  Chest x-ray, on my preliminary reading, no acute cardiopulmonary abnormality.  Follow-up official results  Telemetry.  Full dose of aspirin 324 mg x 1 dose given in the emergency room.  Continue with patient's aspirin 81 mg once a day, starting tomorrow.  IV heparin drip, ACS protocol, ordered in the emergency room, continue.  Cardiology consult.  Continue patient's Toprol-XL, and statin medication.  Manage poorly controlled hypertension.  Nitroglycerin on an as-needed basis.  Pain management on as-needed basis.  Oxygen as needed.  Echocardiogram.  Cardiac diet.  N.p.o. postmidnight, for possible cardiac catheterization tomorrow, 6/17.

## 2025-06-16 NOTE — ASSESSMENT & PLAN NOTE
"No results found for: \"HGBA1C\"    No results for input(s): \"POCGLU\" in the last 72 hours.    Blood Sugar Average: Last 72 hrs:    Check A1c level.  According to the patient, she takes glimepiride at home, hold off for now while in the hospital.  Insulin sliding scale.    Monitor blood sugars.  Send from cardiac diet, also for diabetic diet  Adjust treatment accordingly.  Hypoglycemia protocol.  "

## 2025-06-16 NOTE — ED ATTENDING ATTESTATION
6/16/2025  I, Kirk Lopez MD, saw and evaluated the patient. I have discussed the patient with the resident/non-physician practitioner and agree with the resident's/non-physician practitioner's findings, Plan of Care, and MDM as documented in the resident's/non-physician practitioner's note, except where noted. All available labs and Radiology studies were reviewed.  I was present for key portions of any procedure(s) performed by the resident/non-physician practitioner and I was immediately available to provide assistance.       At this point I agree with the current assessment done in the Emergency Department.  I have conducted an independent evaluation of this patient a history and physical is as follows:    History    Patient is a 69-year-old female, with a history significant for CAD per my review of medical record, who presents to the ED today for evaluation of a 2-day history of intermittent, atraumatic, nonradiating, nonexertional/nonpleuritic/not worsened with laying back, tight/heavy in character chest pain.  Patient states she last felt this chest pain last evening. She called her doctor this morning to try to make an appointment who advised her to present to the emergency department.  Patient denies fever, dyspnea, abdominal pain, weakness, numbness, urinary symptoms.  No clear exacerbating or remitting factors.  Patient states she currently feels that she is at her baseline state of health.    Patient is without other concerns at this time.     ROS  Patient denies: Fever; dysphagia; vision change; dyspnea; abdominal pain; polyuria; dysuria; rash; weakness; numbness; difficulty walking; confusion    Physical Exam    GENERAL APPEARANCE: NAD  NEURO: Patient is speaking clearly in complete sentences.  Patient is answering appropriately and able follow commands.  Patient is moving all four extremities spontaneously.  No facial droop.  Tongue midline.  HEENT: PERRL, Moist mucous membranes,  external ears normal, nose normal  Neck: No cervical adenopathy  CV: RRR. No murmurs, rubs, gallops.  2+ pulses all 4 extremities  LUNGS: Clear to auscultation: No wheezes, stridor, rhonchi, rales  GI: Abdomen non-distended. Soft. Non-tender and without rebound or guarding   : Deferred at this time  MSK: No deformity.  No lower extremity edema.  No pain with calf squeeze.  Skin: Warm and dry  Capillary refill: <2 seconds    Patient is currently afebrile and hemodynamically stable    Assessment/Plan/MDM  Chest pain  -This presentation is concerning for: ACS, electrolyte abnormality, anemia, pneumothorax.  Doubt dissection based on history/physical exam  - Will investigate with cardiac workup  - Will manage based upon workup    ED Course  ED Course as of 06/16/25 1054   Mon Jun 16, 2025   1028 ECG per my independent interpretation: Normal rate, regular rhythm, no ectopy, normal axis, no ST elevations or depressions     1028 hs TnI 0hr(!): 197  High, will initiate ASA and heparin.  Patient denies history of intracranial hemorrhage and blood in stool   1050 Critical Care Time Statement: Upon my evaluation, this patient had a high probability of imminent or life-threatening deterioration due to suspected ACS with CP and elevated troponin, which required my direct attention, intervention, and personal management.  I spent a total of 30 minutes directly providing critical care services, including interpretation of complex medical databases, evaluating for the presence of life-threatening injuries or illnesses, management of organ system failure(s) , complex medical decision making (to support/prevent further life-threatening deterioration)., interpretation of hemodynamic data, and titration of continuous IV medications (drips). This time is exclusive of procedures, teaching, treating other patients, family meetings, and any prior time recorded by providers other than myself.            Critical Care  Time  Procedures

## 2025-06-16 NOTE — ASSESSMENT & PLAN NOTE
History of inferior MI 2015 s/p PCI to 100% mid RCA occlusion.    Minor luminal irregularities noted in other vessels at the time of catheterization  LVEF preserved at time of MI  Nuclear stress test 2019 without ischemia  Maintained on aspirin, statin, and beta-blocker

## 2025-06-17 ENCOUNTER — APPOINTMENT (INPATIENT)
Dept: NON INVASIVE DIAGNOSTICS | Facility: HOSPITAL | Age: 70
DRG: 322 | End: 2025-06-17
Payer: COMMERCIAL

## 2025-06-17 VITALS
TEMPERATURE: 98 F | RESPIRATION RATE: 20 BRPM | OXYGEN SATURATION: 94 % | HEART RATE: 74 BPM | HEIGHT: 66 IN | SYSTOLIC BLOOD PRESSURE: 139 MMHG | DIASTOLIC BLOOD PRESSURE: 75 MMHG | BODY MASS INDEX: 31.5 KG/M2 | WEIGHT: 196 LBS

## 2025-06-17 PROBLEM — I21.4 NSTEMI (NON-ST ELEVATED MYOCARDIAL INFARCTION) (HCC): Status: ACTIVE | Noted: 2019-12-19

## 2025-06-17 PROBLEM — D72.829 LEUKOCYTOSIS: Status: RESOLVED | Noted: 2025-06-16 | Resolved: 2025-06-17

## 2025-06-17 LAB
ALBUMIN SERPL BCG-MCNC: 3.3 G/DL (ref 3.5–5)
ALP SERPL-CCNC: 96 U/L (ref 34–104)
ALT SERPL W P-5'-P-CCNC: 11 U/L (ref 7–52)
ANION GAP SERPL CALCULATED.3IONS-SCNC: 4 MMOL/L (ref 4–13)
AORTIC ROOT: 2.6 CM
ASCENDING AORTA: 3.5 CM
AST SERPL W P-5'-P-CCNC: 15 U/L (ref 13–39)
BASOPHILS # BLD AUTO: 0.06 THOUSANDS/ÂΜL (ref 0–0.1)
BASOPHILS NFR BLD AUTO: 1 % (ref 0–1)
BILIRUB SERPL-MCNC: 0.96 MG/DL (ref 0.2–1)
BSA FOR ECHO PROCEDURE: 1.98 M2
BUN SERPL-MCNC: 16 MG/DL (ref 5–25)
CALCIUM ALBUM COR SERPL-MCNC: 9 MG/DL (ref 8.3–10.1)
CALCIUM SERPL-MCNC: 8.4 MG/DL (ref 8.4–10.2)
CHLORIDE SERPL-SCNC: 109 MMOL/L (ref 96–108)
CHOLEST SERPL-MCNC: 97 MG/DL (ref ?–200)
CO2 SERPL-SCNC: 31 MMOL/L (ref 21–32)
CREAT SERPL-MCNC: 0.87 MG/DL (ref 0.6–1.3)
E WAVE DECELERATION TIME: 295 MS
E/A RATIO: 0.78
EOSINOPHIL # BLD AUTO: 0.12 THOUSAND/ÂΜL (ref 0–0.61)
EOSINOPHIL NFR BLD AUTO: 1 % (ref 0–6)
ERYTHROCYTE [DISTWIDTH] IN BLOOD BY AUTOMATED COUNT: 13.2 % (ref 11.6–15.1)
FRACTIONAL SHORTENING: 32 (ref 28–44)
GFR SERPL CREATININE-BSD FRML MDRD: 68 ML/MIN/1.73SQ M
GLUCOSE SERPL-MCNC: 120 MG/DL (ref 65–140)
GLUCOSE SERPL-MCNC: 141 MG/DL (ref 65–140)
GLUCOSE SERPL-MCNC: 185 MG/DL (ref 65–140)
GLUCOSE SERPL-MCNC: 68 MG/DL (ref 65–140)
GLUCOSE SERPL-MCNC: 78 MG/DL (ref 65–140)
HCT VFR BLD AUTO: 37.8 % (ref 34.8–46.1)
HDLC SERPL-MCNC: 38 MG/DL
HGB BLD-MCNC: 12 G/DL (ref 11.5–15.4)
IMM GRANULOCYTES # BLD AUTO: 0.04 THOUSAND/UL (ref 0–0.2)
IMM GRANULOCYTES NFR BLD AUTO: 0 % (ref 0–2)
INTERVENTRICULAR SEPTUM IN DIASTOLE (PARASTERNAL SHORT AXIS VIEW): 1.1 CM
INTERVENTRICULAR SEPTUM: 1.1 CM (ref 0.6–1.1)
LAAS-AP2: 13.5 CM2
LAAS-AP4: 16.9 CM2
LDLC SERPL CALC-MCNC: 42 MG/DL (ref 0–100)
LEFT ATRIUM SIZE: 3.3 CM
LEFT ATRIUM VOLUME (MOD BIPLANE): 36 ML
LEFT ATRIUM VOLUME INDEX (MOD BIPLANE): 18.2 ML/M2
LEFT INTERNAL DIMENSION IN SYSTOLE: 2.5 CM (ref 2.1–4)
LEFT VENTRICULAR INTERNAL DIMENSION IN DIASTOLE: 3.7 CM (ref 3.5–6)
LEFT VENTRICULAR POSTERIOR WALL IN END DIASTOLE: 1.2 CM
LEFT VENTRICULAR STROKE VOLUME: 37 ML
LV EF US.2D.A4C+ESTIMATED: 69 %
LVSV (TEICH): 37 ML
LYMPHOCYTES # BLD AUTO: 2.47 THOUSANDS/ÂΜL (ref 0.6–4.47)
LYMPHOCYTES NFR BLD AUTO: 24 % (ref 14–44)
MCH RBC QN AUTO: 27.8 PG (ref 26.8–34.3)
MCHC RBC AUTO-ENTMCNC: 31.7 G/DL (ref 31.4–37.4)
MCV RBC AUTO: 88 FL (ref 82–98)
MONOCYTES # BLD AUTO: 0.7 THOUSAND/ÂΜL (ref 0.17–1.22)
MONOCYTES NFR BLD AUTO: 7 % (ref 4–12)
MV E'TISSUE VEL-SEP: 7 CM/S
MV PEAK A VEL: 0.77 M/S
MV PEAK E VEL: 60 CM/S
MV STENOSIS PRESSURE HALF TIME: 85 MS
MV VALVE AREA P 1/2 METHOD: 2.59
NEUTROPHILS # BLD AUTO: 6.81 THOUSANDS/ÂΜL (ref 1.85–7.62)
NEUTS SEG NFR BLD AUTO: 67 % (ref 43–75)
NRBC BLD AUTO-RTO: 0 /100 WBCS
PLATELET # BLD AUTO: 156 THOUSANDS/UL (ref 149–390)
PMV BLD AUTO: 12 FL (ref 8.9–12.7)
POTASSIUM SERPL-SCNC: 3.9 MMOL/L (ref 3.5–5.3)
PROT SERPL-MCNC: 6.1 G/DL (ref 6.4–8.4)
RA PRESSURE ESTIMATED: 3 MMHG
RBC # BLD AUTO: 4.31 MILLION/UL (ref 3.81–5.12)
RIGHT ATRIUM AREA SYSTOLE A4C: 11.7 CM2
RIGHT VENTRICLE ID DIMENSION: 3 CM
RV PSP: 18 MMHG
SL CV LEFT ATRIUM LENGTH A2C: 5 CM
SL CV LV EF: 65
SL CV PED ECHO LEFT VENTRICLE DIASTOLIC VOLUME (MOD BIPLANE) 2D: 60 ML
SL CV PED ECHO LEFT VENTRICLE SYSTOLIC VOLUME (MOD BIPLANE) 2D: 22 ML
SODIUM SERPL-SCNC: 144 MMOL/L (ref 135–147)
TR MAX PG: 15 MMHG
TR PEAK VELOCITY: 1.9 M/S
TRICUSPID ANNULAR PLANE SYSTOLIC EXCURSION: 2.1 CM
TRICUSPID VALVE PEAK REGURGITATION VELOCITY: 1.92 M/S
TRIGL SERPL-MCNC: 84 MG/DL (ref ?–150)
WBC # BLD AUTO: 10.2 THOUSAND/UL (ref 4.31–10.16)

## 2025-06-17 PROCEDURE — 85025 COMPLETE CBC W/AUTO DIFF WBC: CPT | Performed by: INTERNAL MEDICINE

## 2025-06-17 PROCEDURE — 93306 TTE W/DOPPLER COMPLETE: CPT

## 2025-06-17 PROCEDURE — 93306 TTE W/DOPPLER COMPLETE: CPT | Performed by: STUDENT IN AN ORGANIZED HEALTH CARE EDUCATION/TRAINING PROGRAM

## 2025-06-17 PROCEDURE — 80061 LIPID PANEL: CPT | Performed by: INTERNAL MEDICINE

## 2025-06-17 PROCEDURE — 80053 COMPREHEN METABOLIC PANEL: CPT | Performed by: INTERNAL MEDICINE

## 2025-06-17 PROCEDURE — 82948 REAGENT STRIP/BLOOD GLUCOSE: CPT

## 2025-06-17 PROCEDURE — 99239 HOSP IP/OBS DSCHRG MGMT >30: CPT

## 2025-06-17 PROCEDURE — 99232 SBSQ HOSP IP/OBS MODERATE 35: CPT | Performed by: INTERNAL MEDICINE

## 2025-06-17 RX ORDER — TICAGRELOR 90 MG/1
90 TABLET, FILM COATED ORAL 2 TIMES DAILY
Qty: 60 TABLET | Refills: 11 | Status: SHIPPED | OUTPATIENT
Start: 2025-06-17 | End: 2025-06-17

## 2025-06-17 RX ORDER — CARVEDILOL 12.5 MG/1
12.5 TABLET ORAL 2 TIMES DAILY WITH MEALS
Qty: 60 TABLET | Refills: 0 | Status: SHIPPED | OUTPATIENT
Start: 2025-06-17

## 2025-06-17 RX ORDER — TICAGRELOR 90 MG/1
90 TABLET, FILM COATED ORAL 2 TIMES DAILY
Qty: 60 TABLET | Refills: 0 | Status: SHIPPED | OUTPATIENT
Start: 2025-06-17

## 2025-06-17 RX ORDER — GLIMEPIRIDE 1 MG/1
1 TABLET ORAL
COMMUNITY

## 2025-06-17 RX ORDER — NITROGLYCERIN 0.4 MG/1
0.4 TABLET SUBLINGUAL
Qty: 12 TABLET | Refills: 0 | Status: SHIPPED | OUTPATIENT
Start: 2025-06-17

## 2025-06-17 RX ORDER — CARVEDILOL 12.5 MG/1
12.5 TABLET ORAL 2 TIMES DAILY WITH MEALS
Status: DISCONTINUED | OUTPATIENT
Start: 2025-06-17 | End: 2025-06-17 | Stop reason: HOSPADM

## 2025-06-17 RX ADMIN — ASPIRIN 81 MG: 81 TABLET, CHEWABLE ORAL at 08:58

## 2025-06-17 RX ADMIN — NITROGLYCERIN 1 INCH: 20 OINTMENT TOPICAL at 10:55

## 2025-06-17 RX ADMIN — TICAGRELOR 90 MG: 90 TABLET ORAL at 08:58

## 2025-06-17 RX ADMIN — ATORVASTATIN CALCIUM 80 MG: 40 TABLET, FILM COATED ORAL at 08:58

## 2025-06-17 RX ADMIN — NITROGLYCERIN 1 INCH: 20 OINTMENT TOPICAL at 05:05

## 2025-06-17 NOTE — NURSING NOTE
Reviewed discharge paperwork with patient. Pt took all personal items from room. Pt discharged via wheelchair to main entrance.

## 2025-06-17 NOTE — PROGRESS NOTES
Progress Note - Cardiology   Name: Patricia Singleton 69 y.o. female I MRN: [id number]  Unit/Bed#: S -01 I Date of Admission: 6/16/2025   Date of Service: 6/17/2025 I Hospital Day: 1     Assessment & Plan  NSTEMI (non-ST elevated myocardial infarction) (HCC)  1 week history of sharp chest pain with intermittent radiation to her left arm.  Similar to symptoms prior to MI  Episodes only lasting for a couple of minutes before spontaneously resolving on their own  Troponin 197>>209>>218  ECG: Normal sinus rhythm with nonspecific T wave abnormality  [facility] 6/16: Culprit was a 99% proximal RCA lesion prior to previous mid RCA stent.  Now s/p PCI/SORAIDA placement.  Mid RCA stent is patent.  She has moderate nonobstructive disease of other vessels.  DAPT with ASA and ticagrelor.  Cost of ticagrelor is $0 per month  Continue statin and beta-blocker  Cardiac rehab referral at discharge  TTE pending  CAD (coronary artery disease)  History of inferior MI 2015 s/p PCI to 100% mid RCA occlusion.    LVEF preserved at time of MI  Nuclear stress test 2019 without ischemia  Maintained on aspirin, statin, and beta-blocker  See above  Hypertension  Uncontrolled in the hospital, 198/91  Per chart review, normally 130-140/80s at PCP  Toprol-XL changed to carvedilol 12.5 mg p.o. twice daily on 6/16 with improved BP control  Hyperlipidemia  Lipid profile 2024-total cholesterol 130, HDL 51, triglycerides 123, LDL 54  Taking atorvastatin 40 mg daily  Type 2 diabetes mellitus without complication, without long-term current use of insulin ([facility])  A1c 7.1%    Plan/Recommendations  DAPT for minimum of 1 year with aspirin and ticagrelor.  Cost of ticagrelor is $0  Continue carvedilol 12.5 mg p.o. twice daily and atorvastatin 40 mg daily  Follow-up echocardiogram results  Anticipate stable for discharge home later today  Post MI and cardiac catheterization instructions discussed with patient  Cardiac rehab referral placed for discharge  Outpatient  "follow up arranged    Subjective  \"I feel much better\"  Review of Systems   Constitutional: Negative for chills, malaise/fatigue and weight gain.   Cardiovascular:  Negative for chest pain, dyspnea on exertion, leg swelling, orthopnea, palpitations and syncope.   Respiratory:  Negative for cough, shortness of breath, sleep disturbances due to breathing and sputum production.    Endocrine: Negative.    Hematologic/Lymphatic: Negative.    Gastrointestinal:  Negative for bloating and nausea.   Genitourinary: Negative.    Neurological:  Negative for dizziness, light-headedness and weakness.   Psychiatric/Behavioral:  Negative for altered mental status.    All other systems reviewed and are negative.      Objective:   Vitals: Blood pressure 132/76, pulse 68, temperature 98.4 °F (36.9 °C), resp. rate 20, height 5' 6\" (1.676 m), weight 89 kg (196 lb 3.4 oz), SpO2 96%., Body mass index is 31.67 kg/m².,     Systolic (24hrs), Av , Min:128 , Max:198     Diastolic (24hrs), Av, Min:67, Max:91      Intake/Output Summary (Last 24 hours) at 2025 1046  Last data filed at 2025 0901  Gross per 24 hour   Intake 22.67 ml   Output 600 ml   Net -577.33 ml     Wt Readings from Last 3 Encounters:   25 89 kg (196 lb 3.4 oz)   19 89.4 kg (197 lb)   17 88 kg (194 lb)     Telemetry Review: NSR, occasional sinus bradycardia    Physical Exam  Vitals reviewed.   Constitutional:       General: She is not in acute distress.     Appearance: She is obese.   Neck:      Vascular: No hepatojugular reflux or JVD.     Cardiovascular:      Rate and Rhythm: Normal rate and regular rhythm.      Heart sounds: Normal heart sounds. No murmur heard.     No friction rub. No gallop.   Pulmonary:      Effort: Pulmonary effort is normal. No respiratory distress.      Breath sounds: No rales.   Abdominal:      General: Bowel sounds are normal. There is no distension.      Palpations: Abdomen is soft.      Tenderness: There is no " "abdominal tenderness.     Musculoskeletal:         General: No tenderness. Normal range of motion.      Cervical back: Neck supple.      Right lower leg: No edema.      Left lower leg: No edema.     Skin:     General: Skin is warm and dry.      Capillary Refill: Capillary refill takes less than 2 seconds.      Findings: No erythema.      Comments: Right radial cath site: healing well. Mildly ecchymotic, no erythema or hematoma. 2+ radial pulse.     Neurological:      Mental Status: She is alert and oriented to person, place, and time.     Psychiatric:         Mood and Affect: Mood normal.         LABORATORY RESULTS      CBC with diff:   Results from last 7 days   Lab Units 06/17/25  0430 06/16/25  0954   WBC Thousand/uL 10.20* 10.69*   HEMOGLOBIN g/dL 12.0 14.0   HEMATOCRIT % 37.8 43.0   MCV fL 88 86   PLATELETS Thousands/uL 156 165   RBC Million/uL 4.31 5.03   MCH pg 27.8 27.8   MCHC g/dL 31.7 32.6   RDW % 13.2 13.0   MPV fL 12.0 11.5   NRBC AUTO /100 WBCs 0 0       CMP:  Results from last 7 days   Lab Units 06/17/25  0430 06/16/25  0954   POTASSIUM mmol/L 3.9 4.2   CHLORIDE mmol/L 109* 104   CO2 mmol/L 31 32   BUN mg/dL 16 19   CREATININE mg/dL 0.87 0.95   CALCIUM mg/dL 8.4 8.6   AST U/L 15 18   ALT U/L 11 15   ALK PHOS U/L 96 115*   EGFR ml/min/1.73sq m 68 61       BMP:  Results from last 7 days   Lab Units 06/17/25  0430 06/16/25  0954   POTASSIUM mmol/L 3.9 4.2   CHLORIDE mmol/L 109* 104   CO2 mmol/L 31 32   BUN mg/dL 16 19   CREATININE mg/dL 0.87 0.95   CALCIUM mg/dL 8.4 8.6       Lab Results   Component Value Date    CREATININE 0.87 06/17/2025    CREATININE 0.95 06/16/2025    CREATININE 0.88 12/19/2019       No results found for: \"NTBNP\"               Results from last 7 days   Lab Units 06/16/25  0954   HEMOGLOBIN A1C % 7.1*              Results from last 7 days   Lab Units 06/16/25  0954   INR  0.95       Lipid Profile:   Lab Results   Component Value Date    CHOL 152 09/29/2015    CHOL 169 09/28/2015 "     Lab Results   Component Value Date    HDL 38 (L) 06/17/2025    HDL 42 09/29/2015    HDL 42 09/28/2015     Lab Results   Component Value Date    LDLCALC 42 06/17/2025    LDLCALC 87 09/29/2015    LDLCALC 96 09/28/2015     Lab Results   Component Value Date    TRIG 84 06/17/2025    TRIG 113 09/29/2015    TRIG 155 09/28/2015       Meds/Allergies   all current active meds have been reviewed and current meds:   Current Facility-Administered Medications:     acetaminophen (TYLENOL) tablet 650 mg, Q6H PRN    aspirin chewable tablet 81 mg, Daily    atorvastatin (LIPITOR) tablet 80 mg, Daily    [Transfer Hold] carvedilol (COREG) tablet 12.5 mg, BID With Meals    hydrALAZINE (APRESOLINE) injection 5 mg, Q4H PRN    insulin lispro (HumALOG/ADMELOG) 100 units/mL subcutaneous injection 1-5 Units, HS    insulin lispro (HumALOG/ADMELOG) 100 units/mL subcutaneous injection 1-6 Units, TID AC **AND** Fingerstick Glucose (POCT), TID AC    morphine injection 2 mg, Q4H PRN    nitroglycerin (NITRO-BID) 2 % TD ointment 1 inch, Q6H    nitroglycerin (NITROSTAT) SL tablet 0.4 mg, Q5 Min PRN    ondansetron (ZOFRAN) injection 4 mg, Q6H PRN    oxyCODONE (ROXICODONE) IR tablet 5 mg, Q6H PRN    ticagrelor (BRILINTA) tablet 90 mg, BID    traMADol (ULTRAM) tablet 50 mg, Q6H PRN    Medications Prior to Admission:     aspirin 81 MG tablet    atorvastatin (LIPITOR) 80 mg tablet    glimepiride (AMARYL) 1 mg tablet    metoprolol succinate (TOPROL-XL) 50 mg 24 hr tablet    traMADol (ULTRAM) 50 mg tablet         Counseling / Coordination of Care  Total floor / unit time spent today 20 minutes.  Greater than 50% of total time was spent with the patient and / or family counseling and / or coordination of care.      ** Please Note: Dragon 360 Dictation voice to text software may have been used in the creation of this document. **

## 2025-06-17 NOTE — DISCHARGE INSTR - AVS FIRST PAGE
1. Please see the post cardiac catheterization/ angioseal closure device instructions and stent booklet. No heavy lifting, greater than 10 lbs. or strenuous  activity for 48 hrs.  See the post cardiac catheterization/ angioseal closure device instructions.     2.Remove band aid tomorrow.  Shower and wash area- wrist gently with soap and water- beginning tomorrow. Rinse and pat dry.  Apply new water seal band aid.  Repeat this process for 5 days. No powders, creams lotions or antibiotic ointments  for 5 days.  No tub baths, hot tubs or swimming for 5 days.     3.No driving until 6/28/25    4. Do not stop aspirin or Brilinta (ticagrelor) for any reason without a cardiologist’s consent, or the stent could block up and cause a heart attack.    Medication changes  Stop taking metoprolol 50 mg daily for your blood pressure  Start taking carvedilol 12.5 mg twice a day with meals for blood pressure  Take aspirin 81 mg daily  Take Brilinta 90 mg twice daily to prevent platelet aggregation on your stent  Take nitroglycerin as needed for chest pain    Follow-up  Please follow-up with cardiology on 6/24/2025  Please follow-up with your primary care provider within 1 week for continuity of care    Return precautions  Please return to the emergency department for further evaluation if you develop new or worsening chest pain, shortness of breath, persistent nausea or vomiting, back or arm pain that radiates from the chest, signs of infection at your catheterization site.    Please continue to take your atorvastatin and eat a heart healthy/Mediterranean diet, engage in any at least 150 minutes of low intensity exercise per week and limit your tobacco use/alcohol use to reduce your risk of further cardiac event

## 2025-06-17 NOTE — ASSESSMENT & PLAN NOTE
History of inferior MI 2015 s/p PCI to 100% mid RCA occlusion.    LVEF preserved at time of MI  Nuclear stress test 2019 without ischemia  Maintained on aspirin, statin, and beta-blocker  See above

## 2025-06-17 NOTE — ASSESSMENT & PLAN NOTE
Lab Results   Component Value Date    HGBA1C 7.1 (H) 06/16/2025       Recent Labs     06/17/25  0758 06/17/25  0822 06/17/25  1113 06/17/25  1449   POCGLU 68 120 141* 185*       Blood Sugar Average: Last 72 hrs:  (P) 134.125  Continue home glimepiride  Mildly elevated hemoglobin A1c at 7.1%, follow-up with PCP.

## 2025-06-17 NOTE — ASSESSMENT & PLAN NOTE
Poorly controlled at this time.  According to the patient, he already took her medications today including her Toprol-XL.  Switch from metoprolol XL to carvedilol

## 2025-06-17 NOTE — CASE MANAGEMENT
Case Management Progress Note    Patient name Patricia Singleton  Location S /S -01 MRN [id number]  : 1955 Date 2025       LOS (days): 1  Geometric Mean LOS (GMLOS) (days): 1.7  Days to GMLOS:0.7        OBJECTIVE:        Current admission status: Inpatient  Preferred Pharmacy:   [facility] [address] [address]  Phone: [phone] Fax: [phone]    Primary Care Provider: Jose Solis DO    Primary Insurance: MEDICARE  Secondary Insurance: BLUE CROSS    PROGRESS NOTE:  Per the Pt's local pharmacy of [facility], the Pt would have a $0 copay for Brilinta but the pharmacy reported they would not have it in until tomorrow  after noon. CM made Cardiology GAYLE Hagen and SLIM PA Meme aware. Family to transport upon d/c.

## 2025-06-17 NOTE — ASSESSMENT & PLAN NOTE
1 week history of sharp chest pain with intermittent radiation to her left arm.  Similar to symptoms prior to MI  Episodes only lasting for a couple of minutes before spontaneously resolving on their own  Troponin 197>>209>>218  ECG: Normal sinus rhythm with nonspecific T wave abnormality  [facility] 6/16: Culprit was a 99% proximal RCA lesion prior to previous mid RCA stent.  Now s/p PCI/SORAIDA placement.  Mid RCA stent is patent.  She has moderate nonobstructive disease of other vessels.  DAPT with ASA and ticagrelor.  Cost of ticagrelor is $0 per month  Continue statin and beta-blocker  Cardiac rehab referral at discharge  TTE pending

## 2025-06-17 NOTE — ASSESSMENT & PLAN NOTE
Uncontrolled in the hospital, 198/91  Per chart review, normally 130-140/80s at PCP  Toprol-XL changed to carvedilol 12.5 mg p.o. twice daily on 6/16 with improved BP control

## 2025-06-17 NOTE — PLAN OF CARE
Problem: Potential for Falls  Goal: Patient will remain free of falls  Description: INTERVENTIONS:  - Educate patient/family on patient safety including physical limitations  - Instruct patient to call for assistance with activity   - Consider consulting OT/PT to assist with strengthening/mobility based on AM PAC & -HLM score  - Consult OT/PT to assist with strengthening/mobility   - Keep Call bell within reach  - Keep bed low and locked with side rails adjusted as appropriate  - Keep care items and personal belongings within reach  - Initiate and maintain comfort rounds  Outcome: Progressing

## 2025-06-17 NOTE — HOSPITAL COURSE
1 week intermittent chest pain admitted for NSTEMI rule out.  Initial set of troponins elevated, EKG without acute ischemic changes.  Patient was loaded with aspirin and prepped for left heart catheterization on 6/16.  Catheterization found 99% occluded proximal RCA for which patient had a PCI/SORAIDA.  She will be discharged on DAPT with cardiac rehab.  TTE found preserved ejection fraction without wall abnormality.  Given that she tolerated the procedure well, has resolution of symptoms and is chest pain-free at this time she is medically stable for discharge.

## 2025-06-17 NOTE — UTILIZATION REVIEW
Initial Clinical Review    Admission: Date/Time/Statement:   Admission Orders (From admission, onward)       Ordered        06/16/25 1044  INPATIENT ADMISSION  Once                          Orders Placed This Encounter   Procedures    INPATIENT ADMISSION     Standing Status:   Standing     Number of Occurrences:   1     Level of Care:   Med Surg [16]     Estimated length of stay:   More than 2 Midnights     Certification:   I certify that inpatient services are medically necessary for this patient for a duration of greater than two midnights. See H&P and MD Progress Notes for additional information about the patient's course of treatment.     ED Arrival Information       Expected   -    Arrival   6/16/2025 09:36    Acuity   Urgent              Means of arrival   Walk-In    Escorted by   Self    Service   Hospitalist    Admission type   Emergency              Arrival complaint   Chest Pain (x1 week)             Chief Complaint   Patient presents with    Chest Pain     States has been having sharp stabbing chest pain once or twice a day for the last week that occasionally will radiate down arm   No sob/dizziness        Initial Presentation: 69 y.o. female with a PMH significant for hypertension, dyslipidemia, diabetes mellitus, coronary artery disease status post PCI with stent who presents with chest pain. According to the patient, she started having intermittent chest pains for about a week now. She described the pain to be stabbing in character, intensity 7-8/10, localized at the left anterior chest area, with 2 episodes that the pain radiated to her left arm. Her last chest pain episode was last night, none today so far. She called her primary care physician today and was told to go to the nearest emergency room for further evaluation and management. Plan: Inpatient admission for evaluation and treatment of chest pain with elevated troponin, CAD, HTN, HLD, DM, leukocytosis: telemetry,  mg and then continue  ASA 81 mg, IV heparin drip, Cardiology consult, continue home Toprol XL and statin, pain management, Echo, cardiac diabetic diet, NPO at midnight for possible cardiac cath, check lipid panel and HgbA1c, hold home glimepiride and start SSI.    Cardiology consult: Left-sided chest pain intermittent over the last week. Mostly occurring at rest with radiation to left arm. Troponin elevated yet flat with no definitive cause. Hence recommend proceeding with left heart catheterization to redefine coronary anatomy. IV heparin drip. Echo. Continue ASA, statin, BB. Transition metoprolol to carvedilol.     Anticipated Length of Stay/Certification Statement: Patient will be admitted on an inpatient basis with an anticipated length of stay of greater than 2 midnights secondary to evaluation management of acute coronary syndrome (chest pain with elevated troponin).     Date: 6/17   Day 2:     Cardiology: [facility] 6/16: Culprit was a 99% proximal RCA lesion prior to previous mid RCA stent.  Now s/p PCI/SORAIDA placement.  Mid RCA stent is patent.  She has moderate nonobstructive disease of other vessels. DAPT with ASA and ticagrelor. Continue statin and beta-blocker.     Internal medicine: Catheterization found 99% occluded proximal RCA for which patient had a PCI/SORAIDA.  She will be discharged on DAPT with cardiac rehab.  TTE found normal ejection fraction without wall abnormality. She tolerated the procedure well, has resolution of symptoms and is chest pain-free at this time.     ED Treatment-Medication Administration from 06/16/2025 0936 to 06/16/2025 1419         Date/Time Order Dose Route Action     06/16/2025 1145 aspirin chewable tablet 324 mg 324 mg Oral Given     06/16/2025 1145 heparin (porcine) 25,000 units in 0.45% NaCl 250 mL infusion (premix) 11.8 Units/kg/hr Intravenous New Bag     06/16/2025 1146 heparin (porcine) 25,000 units in 0.45% NaCl 250 mL infusion (premix) 11.8 Units/kg/hr Intravenous Restarted            Scheduled  Medications:  aspirin, 81 mg, Oral, Daily  atorvastatin, 80 mg, Oral, Daily  [Transfer Hold] carvedilol, 12.5 mg, Oral, BID With Meals  insulin lispro, 1-5 Units, Subcutaneous, HS  insulin lispro, 1-6 Units, Subcutaneous, TID AC  nitroglycerin, 1 inch, Topical, Q6H  ticagrelor, 90 mg, Oral, BID      Continuous IV Infusions:     PRN Meds:  acetaminophen, 650 mg, Oral, Q6H PRN  hydrALAZINE, 5 mg, Intravenous, Q4H PRN  morphine injection, 2 mg, Intravenous, Q4H PRN  nitroglycerin, 0.4 mg, Sublingual, Q5 Min PRN  ondansetron, 4 mg, Intravenous, Q6H PRN  oxyCODONE, 5 mg, Oral, Q6H PRN  traMADol, 50 mg, Oral, Q6H PRN      ED Triage Vitals [06/16/25 0942]   Temperature Pulse Respirations Blood Pressure SpO2 Pain Score   98.2 °F (36.8 °C) 72 18 (!) 186/85 96 % No Pain     Weight (last 2 days)       Date/Time Weight    06/16/25 0943 89 (196.21)            Vital Signs (last 3 days)       Date/Time Temp Pulse Resp BP MAP (mmHg) SpO2 Calculated FIO2 (%) - Nasal Cannula Nasal Cannula O2 Flow Rate (L/min) O2 Device Patient Position - Orthostatic VS Pain    06/17/25 07:50:50 98.4 °F (36.9 °C) 68 20 132/76 95 96 % -- -- -- -- --    06/17/25 0705 -- -- -- -- -- -- -- -- None (Room air) -- No Pain    06/17/25 04:56:20 -- 61 -- 150/67 95 97 % -- -- -- -- --    06/17/25 01:43:10 -- 71 -- 128/69 89 96 % -- -- -- -- --    06/16/25 2145 98.2 °F (36.8 °C) 58 16 139/69 92 96 % -- -- -- Lying --    06/16/25 2115 -- -- -- -- -- -- -- -- -- -- No Pain    06/16/25 2045 98 °F (36.7 °C) 67 18 145/72 -- 96 % -- -- None (Room air) Lying --    06/16/25 1949 -- -- -- -- -- -- -- -- -- -- No Pain    06/16/25 1945 98 °F (36.7 °C) 63 18 151/70 -- 95 % -- -- None (Room air) Lying --    06/16/25 1933 -- -- -- -- -- -- -- -- -- -- No Pain    06/16/25 1918 -- -- -- -- -- -- -- -- -- -- No Pain    06/16/25 1903 -- -- -- -- -- -- -- -- -- -- No Pain    06/16/25 1845 -- 62 16 143/72 96 95 % -- -- None (Room air) Sitting --    06/16/25 1757 -- -- -- -- -- -- --  -- -- -- No Pain    06/16/25 1745 -- 63 -- 149/78 102 98 % -- -- -- -- --    06/16/25 17:40:02 -- -- -- -- -- 98 % -- -- -- -- --    06/16/25 1727 -- -- -- -- -- -- -- -- -- -- No Pain    06/16/25 1715 -- 61 -- 161/79 -- -- -- -- -- Lying --    06/16/25 1700 -- -- -- -- -- -- -- -- -- -- No Pain    06/16/25 1645 -- 51 -- 159/83 108 -- -- -- -- -- No Pain    06/16/25 1630 -- 53 -- 160/84 109 -- -- -- -- -- No Pain    06/16/25 1615 -- 53 -- 157/75 -- 94 % 24 1 L/min Nasal cannula Lying --    06/16/25 1600 97.6 °F (36.4 °C) 59 16 151/80 -- 96 % -- -- None (Room air) Lying No Pain    06/16/25 14:26:26 -- -- -- -- -- -- 28 2 L/min Nasal cannula -- --    06/16/25 1340 -- -- -- -- -- -- -- -- -- -- No Pain    06/16/25 1330 -- 54 -- 185/81 117 99 % -- -- -- -- --    06/16/25 1300 -- 55 -- 187/82 118 99 % -- -- -- -- --    06/16/25 1200 -- 55 -- 198/91 131 99 % -- -- -- -- --    06/16/25 1145 -- 56 -- -- -- 97 % -- -- -- -- No Pain    06/16/25 1000 -- 65 -- 178/90 125 97 % -- -- -- -- --    06/16/25 0942 98.2 °F (36.8 °C) 72 18 186/85 122 96 % -- -- -- -- No Pain              Pertinent Labs/Diagnostic Test Results:   Radiology:  XR chest 2 views   ED Interpretation by Heber Michael DO (06/16 1013)   No acute cardiopulmonary process.  Compared to prior chest x-ray done on 12/19/2019.      Final Interpretation by Luiza Dominguez MD (06/16 4784)      No acute cardiopulmonary disease.            Workstation performed: [id number]           Cardiology:  Cardiac catheterization   Final Result by Jacinto Chiang MD (06/16 4328)        Prox Cx lesion is 50% stenosed.     Prox LAD lesion is 40% stenosed.     2nd Diag lesion is 50% stenosed.     Prox RCA lesion is 99% stenosed.     RPAV lesion is 50% stenosed.     The angioplasty was pre-stent angioplasty.     The angioplasty was pre-stent angioplasty.     The angioplasty was pre-stent angioplasty.     The angioplasty was pre-stent angioplasty.     The angioplasty was pre-stent  angioplasty.      Severe pRCA/calcified culprit, s/p PCI   Patent mRCA stent         ECG 12 lead   Final Result by Whitney Albetrs DO (06/16 1913)   Sinus bradycardia   Otherwise normal ECG   When compared with ECG of 16-Jun-2025 12:12, (unconfirmed)   No significant change was found   Confirmed by Whitney Alberts ([id number]) on 6/16/2025 7:13:53 PM      ECG 12 lead   Final Result by Whitney Alberts DO (06/16 1913)   Sinus bradycardia with sinus arrhythmia   Otherwise normal ECG   When compared with ECG of 16-Jun-2025 09:48, (unconfirmed)   No significant change was found   Confirmed by Whitney Alberts ([id number]) on 6/16/2025 7:13:51 PM      ECG 12 lead   Final Result by Whitney Alberts DO (06/16 1913)   Normal sinus rhythm   Nonspecific T wave abnormality   Abnormal ECG   When compared with ECG of 19-Dec-2019 06:39,   Nonspecific T wave abnormality now evident in Inferior leads   T wave inversion no longer evident in Anterior leads   Confirmed by Whitney Alberts ([id number]) on 6/16/2025 7:13:47 PM        GI:  No orders to display           Results from last 7 days   Lab Units 06/17/25  0430 06/16/25  0954   WBC Thousand/uL 10.20* 10.69*   HEMOGLOBIN g/dL 12.0 14.0   HEMATOCRIT % 37.8 43.0   PLATELETS Thousands/uL 156 165   TOTAL NEUT ABS Thousands/µL 6.81 6.54         Results from last 7 days   Lab Units 06/17/25  0430 06/16/25  0954   SODIUM mmol/L 144 140   POTASSIUM mmol/L 3.9 4.2   CHLORIDE mmol/L 109* 104   CO2 mmol/L 31 32   ANION GAP mmol/L 4 4   BUN mg/dL 16 19   CREATININE mg/dL 0.87 0.95   EGFR ml/min/1.73sq m 68 61   CALCIUM mg/dL 8.4 8.6     Results from last 7 days   Lab Units 06/17/25  0430 06/16/25  0954   AST U/L 15 18   ALT U/L 11 15   ALK PHOS U/L 96 115*   TOTAL PROTEIN g/dL 6.1* 7.1   ALBUMIN g/dL 3.3* 3.7   TOTAL BILIRUBIN mg/dL 0.96 1.10*     Results from last 7 days   Lab Units 06/17/25  0822 06/17/25  0758 06/16/25  2058 06/16/25  1747 06/16/25  1609 06/16/25  1353   POC GLUCOSE mg/dl 120 68 221* 133 118 87     Results from  last 7 days   Lab Units 06/17/25  0430 06/16/25  0954   GLUCOSE RANDOM mg/dL 78 95         Results from last 7 days   Lab Units 06/16/25  0954   HEMOGLOBIN A1C % 7.1*   EAG mg/dl 157       Results from last 7 days   Lab Units 06/16/25  1356 06/16/25  1210 06/16/25  0954   HS TNI 0HR ng/L  --   --  197*   HS TNI 2HR ng/L  --  209*  --    HSTNI D2 ng/L  --  12  --    HS TNI 4HR ng/L 218*  --   --    HSTNI D4 ng/L 21*  --   --          Results from last 7 days   Lab Units 06/16/25  0954   PROTIME seconds 13.3   INR  0.95   PTT seconds 25         Past Medical History[1]  Present on Admission:   Hypertension   Hyperlipidemia   CAD (coronary artery disease)   Chest pain with elevated troponin/ACS   Type 2 diabetes mellitus without complication, without long-term current use of insulin (HCC)   Leukocytosis      Admitting Diagnosis: Unstable angina pectoris (HCC) [I20.0]  Chest pain [R07.9]  Acute chest pain [R07.9]  NSTEMI (non-ST elevated myocardial infarction) (HCC) [I21.4]  Acute coronary syndrome with high troponin ([facility]) [I24.9]  Age/Sex: 69 y.o. female    Network Utilization Review Department  ATTENTION: Please call with any questions or concerns to [phone] and carefully listen to the prompts so that you are directed to the right person. All voicemails are confidential.   For Discharge needs, contact Care Management DC Support Team at [phone] opt. 2  Send all requests for admission clinical reviews, approved or denied determinations and any other requests to dedicated fax number below belonging to the campus where the patient is receiving treatment. List of dedicated fax numbers for the Facilities:  FACILITY NAME UR FAX NUMBER   ADMISSION DENIALS (Administrative/Medical Necessity) [phone]   DISCHARGE SUPPORT TEAM (NETWORK) [phone]   PARENT CHILD HEALTH (Maternity/NICU/Pediatrics) [phone]   [facility] [phone]   [facility] [phone]    Critical access hospital [phone]   [facility] [phone]   [facility] [phone]   [facility] [phone]   [facility] [phone]   [facility] [phone]   [facility] [phone]   [facility] [phone]   [facility] [phone]   [facility] [phone]              [1]   Past Medical History:  Diagnosis Date    Cardiac disease     Diabetes mellitus (HCC)     Dyslipidemia     Hypertension

## 2025-06-17 NOTE — ASSESSMENT & PLAN NOTE
Patient had been having occasional chest pains for 1 week.  Last chest pain episode was last night.  Chest pain would last approximately 1 to 2 minutes.  Pain was described to be stabbing in character, at the left anterior chest area.  There were 2 occasions that the pain radiated to her left arm.    History of CAD status post PCI approximately 10 years ago   cardiovascular risk factors including hypertension, dyslipidemia and diabetes mellitus.  NINOSKA score of 6.  Troponins 197, 209, 218  EKG: Revealed no acute ischemic changes.  On my reading, revealed normal sinus rhythm 68/min, with some artifacts, nonspecific T wave  abnormalities, no significant change as compared to previous EKG dated 12/19/2019.  CXR without acute cardiopulmonary abnormalities  6/16 underwent cardiac catheterization with cardiology revealing 99% occluded RCA status post PCI/SORAIDA  Echo with EF of 65-70%, vigorous SF, normal DF, mildly increased wall thickness of the RV, mild DC, trivial pericardial effusion  Discharged on DAPT with daily aspirin 81 mg and Brilinta 90 mg twice daily.  Brilinta was johnson checked and affordable for the patient.  Cardiac rehab on discharge  Cardiology follow-up in the outpatient setting

## 2025-06-17 NOTE — DISCHARGE SUMMARY
Discharge Summary - Hospitalist   Name: Patricia Singleton 69 y.o. female I MRN: [id number]  Unit/Bed#: S -01 I Date of Admission: 6/16/2025   Date of Service: 6/17/2025 I Hospital Day: 1     Assessment & Plan  NSTEMI (non-ST elevated myocardial infarction) (HCC)  Patient had been having occasional chest pains for 1 week.  Last chest pain episode was last night.  Chest pain would last approximately 1 to 2 minutes.  Pain was described to be stabbing in character, at the left anterior chest area.  There were 2 occasions that the pain radiated to her left arm.    History of CAD status post PCI approximately 10 years ago   cardiovascular risk factors including hypertension, dyslipidemia and diabetes mellitus.  NINOSKA score of 6.  Troponins 197, 209, 218  EKG: Revealed no acute ischemic changes.  On my reading, revealed normal sinus rhythm 68/min, with some artifacts, nonspecific T wave  abnormalities, no significant change as compared to previous EKG dated 12/19/2019.  CXR without acute cardiopulmonary abnormalities  6/16 underwent cardiac catheterization with cardiology revealing 99% occluded RCA status post PCI/SORAIDA  Echo with EF of 65-70%, vigorous SF, normal DF, mildly increased wall thickness of the RV, mild ND, trivial pericardial effusion  Discharged on DAPT with daily aspirin 81 mg and Brilinta 90 mg twice daily.  Brilinta was johnson checked and affordable for the patient.  Cardiac rehab on discharge  Cardiology follow-up in the outpatient setting  CAD (coronary artery disease)  With PCI/stent.  Continue cardiovascular medications.  Secondary risk factor modifications  Hypertension  Poorly controlled at this time.  According to the patient, he already took her medications today including her Toprol-XL.  Switch from metoprolol XL to carvedilol  Hyperlipidemia  Lipids appear well-controlled  Continue patient's atorvastatin.  Type 2 diabetes mellitus without complication, without long-term current use of insulin  ([facility])  Lab Results   Component Value Date    HGBA1C 7.1 (H) 06/16/2025       Recent Labs     06/17/25  0758 06/17/25  0822 06/17/25  1113 06/17/25  1449   POCGLU 68 120 141* 185*       Blood Sugar Average: Last 72 hrs:  (P) 134.125  Continue home glimepiride  Mildly elevated hemoglobin A1c at 7.1%, follow-up with PCP.  Leukocytosis (Resolved: 6/17/2025)  Mild.  Normal differential counts.  No other signs and symptoms of an active infection.  Monitor.  For further evaluation and management if this worsens significantly.     Medical Problems       Resolved Problems  Date Reviewed: 6/16/2025   None       Discharging Physician / Practitioner: Meme Leary PA-C  PCP: Jose Solis DO  Admission Date:   Admission Orders (From admission, onward)       Ordered        06/16/25 1044  INPATIENT ADMISSION  Once                          Discharge Date: 06/17/25    Next Steps for Physician/AP Assuming Care:  Monitor medication management, ensure patient is taking DAPT with aspirin 81 mg and Brilinta 90 mg twice daily  Follow-up blood pressure given change in medication  Ensure lifestyle modifications to prevent further cardiac event  Follow-up hemoglobin A1c      Medication Changes for Discharge & Rationale:   Dual antiplatelet therapy with aspirin 81 mg daily and Brilinta 90 mg twice a day given recent PCI  Metoprolol was ineffective at managing her blood pressure therefore she was switched to carvedilol 12.5 mg twice a day  See after visit summary for reconciled discharge medications provided to patient and/or family.     Consultations During Hospital Stay:  Cardiology    Procedures Performed:   Left heart catheterization    Significant Findings / Test Results:   CXR: No acute cardiopulmonary disease  Echocardiogram  Left Ventricle: Left ventricular cavity size is normal. Wall thickness   is normal. The left ventricular ejection fraction is 65-70%. Systolic   function is vigorous. Diastolic function is normal for age.     " Right Ventricle: Wall thickness is mildly increased.     Pulmonic Valve: There is mild regurgitation.     Pericardium: There is a trivial pericardial effusion circumferential to   the heart.   Cardiac catheterization    Prox Cx lesion is 50% stenosed.     Prox LAD lesion is 40% stenosed.     2nd Diag lesion is 50% stenosed.     Prox RCA lesion is 99% stenosed.     RPAV lesion is 50% stenosed.     The angioplasty was pre-stent angioplasty.     The angioplasty was pre-stent angioplasty.     The angioplasty was pre-stent angioplasty.     The angioplasty was pre-stent angioplasty.     The angioplasty was pre-stent angioplasty.     Severe pRCA/calcified culprit, s/p PCI   Patent mRCA stent   Lipid panel cholesterol 97, triglycerides 84, HDL 38, LDL 42  Hemoglobin A1c 7.1%  Troponins 197, 209, 218      Hospital Course:   Patricia Singleton is a 69 y.o. female patient who originally presented to the hospital on 6/16/2025 due to 1 week intermittent chest pain admitted for NSTEMI rule out.  Initial set of troponins elevated, EKG without acute ischemic changes.  Patient was loaded with aspirin and prepped for left heart catheterization on 6/16.  Catheterization found 99% occluded proximal RCA for which patient had a PCI/SORAIDA.  She will be discharged on DAPT with cardiac rehab.  TTE found normal ejection fraction without wall abnormality.  Given that she tolerated the procedure well, has resolution of symptoms and is chest pain-free at this time she is medically stable for discharge.    Please see above list of diagnoses and related plan for additional information.     Discharge Day Visit / Exam:   Subjective: Patient without shortness of breath, chest pain, palpitations, no acute complaints  Vitals: Blood Pressure: 139/75 (06/17/25 1440)  Pulse: 74 (06/17/25 1440)  Temperature: 98 °F (36.7 °C) (06/17/25 1440)  Temp Source: Oral (06/16/25 2145)  Respirations: 20 (06/17/25 0750)  Height: 5' 6\" (167.6 cm) (06/17/25 1044)  Weight - " Scale: 88.9 kg (196 lb) (06/17/25 1044)  SpO2: 94 % (06/17/25 1440)  Physical Exam  Vitals and nursing note reviewed.   Constitutional:       General: She is not in acute distress.     Appearance: Normal appearance. She is not ill-appearing or toxic-appearing.   HENT:      Mouth/Throat:      Mouth: Mucous membranes are moist.     Cardiovascular:      Rate and Rhythm: Normal rate and regular rhythm.      Heart sounds: Normal heart sounds. No murmur heard.  Pulmonary:      Effort: Pulmonary effort is normal.      Breath sounds: Normal breath sounds. No rales.   Abdominal:      Palpations: Abdomen is soft.      Tenderness: There is no abdominal tenderness.     Musculoskeletal:      Right lower leg: No edema.      Left lower leg: No edema.     Skin:     General: Skin is warm and dry.     Neurological:      Mental Status: She is alert and oriented to person, place, and time. Mental status is at baseline.          Discussion with Family: Patient declined call to .     Discharge instructions/Information to patient and family:   See after visit summary for information provided to patient and family.      Provisions for Follow-Up Care:  See after visit summary for information related to follow-up care and any pertinent home health orders.      Mobility at time of Discharge:   Basic Mobility Inpatient Raw Score: 24  JH-HLM Goal: 8: Walk 250 feet or more  JH-HLM Achieved: 8: Walk 250 feet ot more  HLM Goal achieved. Continue to encourage appropriate mobility.     Disposition:   Home    Planned Readmission: no    Administrative Statements   Discharge Statement:  I have spent a total time of 65 minutes in caring for this patient on the day of the visit/encounter. .    **Please Note: This note may have been constructed using a voice recognition system**

## 2025-06-18 NOTE — UTILIZATION REVIEW
NOTIFICATION OF ADMISSION DISCHARGE   This is a Notification of Discharge from [facility]. Please be advised that this patient has been discharge from our facility. Below you will find the admission and discharge date and time including the patient’s disposition.   UTILIZATION REVIEW CONTACT:  Utilization Review Assistants  Network Utilization Review Department  Phone: [phone] x carefully listen to the prompts. All voicemails are confidential.  Email: [email or web address]     ADMISSION INFORMATION  PRESENTATION DATE: 6/16/2025  9:38 AM  OBERVATION ADMISSION DATE: N/A  INPATIENT ADMISSION DATE: 6/16/25 10:44 AM   DISCHARGE DATE: 6/17/2025  5:00 PM   DISPOSITION:Home/Self Care    Network Utilization Review Department  ATTENTION: Please call with any questions or concerns to [phone] and carefully listen to the prompts so that you are directed to the right person. All voicemails are confidential.   For Discharge needs, contact Care Management DC Support Team at [phone] opt. 2  Send all requests for admission clinical reviews, approved or denied determinations and any other requests to dedicated fax number below belonging to the campus where the patient is receiving treatment. List of dedicated fax numbers for the Facilities:  FACILITY NAME UR FAX NUMBER   ADMISSION DENIALS (Administrative/Medical Necessity) [phone]   DISCHARGE SUPPORT TEAM ([facility]) [phone]   PARENT CHILD HEALTH (Maternity/NICU/Pediatrics) [phone]   [facility] [phone]   [facility] [phone]   [facility] [phone]   [facility] [phone]   Critical access hospital [phone]   [facility] [phone]   [facility] [phone]   [facility] [phone]   [facility]  [facility] [phone]   [facility] [phone]   [facility] [phone]   [place] [phone]

## 2025-06-23 NOTE — UTILIZATION REVIEW
NOTIFICATION OF ADMISSION DISCHARGE   This is a Notification of Discharge from [facility]. Please be advised that this patient has been discharge from our facility. Below you will find the admission and discharge date and time including the patient’s disposition.   UTILIZATION REVIEW CONTACT:  Utilization Review Assistants  Network Utilization Review Department  Phone: [phone] x carefully listen to the prompts. All voicemails are confidential.  Email: [email or web address]     ADMISSION INFORMATION  PRESENTATION DATE: 6/16/2025  9:38 AM  OBERVATION ADMISSION DATE: N/A  INPATIENT ADMISSION DATE: 6/16/25 10:44 AM   DISCHARGE DATE: 6/17/2025  5:00 PM   DISPOSITION:Home/Self Care    Network Utilization Review Department  ATTENTION: Please call with any questions or concerns to [phone] and carefully listen to the prompts so that you are directed to the right person. All voicemails are confidential.   For Discharge needs, contact Care Management DC Support Team at [phone] opt. [phone]  Send all requests for admission clinical reviews, approved or denied determinations and any other requests to dedicated fax number below belonging to the campus where the patient is receiving treatment. List of dedicated fax numbers for the Facilities:  FACILITY NAME UR FAX NUMBER   ADMISSION DENIALS (Administrative/Medical Necessity) [phone]   DISCHARGE SUPPORT TEAM ([facility]) [phone]   PARENT CHILD HEALTH (Maternity/NICU/Pediatrics) [phone]   [facility] [phone]   [facility] [phone]   [facility] [phone]   [facility] [phone]   [facility] [phone]   [facility] [phone]   [facility] [phone]   [facility] [phone]   [facility]  [facility] [phone]   [facility] [phone]   [facility] [phone]   [facility] [phone]

## (undated) DEVICE — HI-TORQUE PILOT 50 GUIDE WIRE .014 STRAIGHT TIP 3.0 CM X 190 CM: Brand: HI-TORQUE PILOT

## (undated) DEVICE — TR BAND RADIAL ARTERY COMPRESSION DEVICE: Brand: TR BAND

## (undated) DEVICE — GUIDELINER CATHETERS ARE INTENDED TO BE USED IN CONJUNCTION WITH GUIDE CATHETERS TO ACCESS DISCRETE REGIONS OF THE CORONARY AND/OR PERIPHERAL VASCULATURE, AND TO FACILITATE PLACEMENT OF INTERVENTIONAL DEVICES.: Brand: GUIDELINER® V3 CATHETER

## (undated) DEVICE — CATH BAL SAPPHIRE II PRO 1 X 15MM

## (undated) DEVICE — Device

## (undated) DEVICE — GUIDEWIRE WHOLEY HI TORQUE INTERM MOD J .035 145CM

## (undated) DEVICE — BALLOON NC EUPHORA 3 X 15MM

## (undated) DEVICE — BALLOON EUPHORA RX 1.5 X 15MM

## (undated) DEVICE — CATH GUIDE LAUNCHER 6FR JR4 110CM

## (undated) DEVICE — BALLOON EUPHORA RX 2.5 X 15MM

## (undated) DEVICE — RADIFOCUS OPTITORQUE ANGIOGRAPHIC CATHETER: Brand: OPTITORQUE

## (undated) DEVICE — BALLOON EUPHORA RX 2 X 15MM

## (undated) DEVICE — GLIDESHEATH BASIC HYDROPHILIC COATED INTRODUCER SHEATH: Brand: GLIDESHEATH